# Patient Record
Sex: FEMALE | Race: WHITE | NOT HISPANIC OR LATINO | Employment: FULL TIME | URBAN - METROPOLITAN AREA
[De-identification: names, ages, dates, MRNs, and addresses within clinical notes are randomized per-mention and may not be internally consistent; named-entity substitution may affect disease eponyms.]

---

## 2022-07-01 ENCOUNTER — TRANSFERRED RECORDS (OUTPATIENT)
Dept: HEALTH INFORMATION MANAGEMENT | Facility: CLINIC | Age: 62
End: 2022-07-01

## 2022-09-10 ENCOUNTER — TRANSFERRED RECORDS (OUTPATIENT)
Dept: HEALTH INFORMATION MANAGEMENT | Facility: CLINIC | Age: 62
End: 2022-09-10

## 2022-10-07 ENCOUNTER — TRANSFERRED RECORDS (OUTPATIENT)
Dept: HEALTH INFORMATION MANAGEMENT | Facility: CLINIC | Age: 62
End: 2022-10-07

## 2022-11-09 ENCOUNTER — TRANSFERRED RECORDS (OUTPATIENT)
Dept: HEALTH INFORMATION MANAGEMENT | Facility: CLINIC | Age: 62
End: 2022-11-09

## 2022-11-09 ENCOUNTER — MEDICAL CORRESPONDENCE (OUTPATIENT)
Dept: HEALTH INFORMATION MANAGEMENT | Facility: CLINIC | Age: 62
End: 2022-11-09

## 2022-12-08 ENCOUNTER — TRANSFERRED RECORDS (OUTPATIENT)
Dept: HEALTH INFORMATION MANAGEMENT | Facility: CLINIC | Age: 62
End: 2022-12-08

## 2022-12-19 ENCOUNTER — TELEPHONE (OUTPATIENT)
Dept: NEUROSURGERY | Facility: CLINIC | Age: 62
End: 2022-12-19

## 2022-12-19 NOTE — TELEPHONE ENCOUNTER
[facility] Call Center    Phone Message    May a detailed message be left on voicemail: yes     Reason for Call: Appointment Intake    Referring Provider Name: Self  Diagnosis and/or Symptoms: Recent surgery 10/4/2022 for cervical fusion and decompression with [facility], patient states she is having complications following procedure.  Patient states circulation is being cut off to her head and left arm.  Patient is requesting to schedule with Neurosurgery.    Action Taken: Message routed to:  [facility] Neurosurgery    Travel Screening: Not Applicable

## 2022-12-20 ENCOUNTER — MEDICAL CORRESPONDENCE (OUTPATIENT)
Dept: HEALTH INFORMATION MANAGEMENT | Facility: CLINIC | Age: 62
End: 2022-12-20

## 2022-12-20 NOTE — TELEPHONE ENCOUNTER
Writer returned call to patient. Patient will follow up with provider for referral to Neurosurgery. Writer provided fax number for referral.     Shannon Rodriguez LPN  Neurosurgery

## 2022-12-22 ENCOUNTER — TRANSCRIBE ORDERS (OUTPATIENT)
Dept: OTHER | Age: 62
End: 2022-12-22

## 2022-12-22 ENCOUNTER — TELEPHONE (OUTPATIENT)
Dept: NEUROSURGERY | Facility: CLINIC | Age: 62
End: 2022-12-22

## 2022-12-22 DIAGNOSIS — M54.12 RADICULOPATHY, CERVICAL: Primary | ICD-10-CM

## 2022-12-22 NOTE — TELEPHONE ENCOUNTER
[facility] Call Center    Phone Message    May a detailed message be left on voicemail: yes     Reason for Call: Other: Pt called to make appt off of the referral but the referral did not give writer enough information for writer to schedule.  Please call Pt back to schedule this appt.  Pt was upset that writer could not schedule appt.    Action Taken: Message routed to:  [facility] Neurosurgery    Travel Screening: Not Applicable

## 2022-12-22 NOTE — TELEPHONE ENCOUNTER
Writer routed to Neurosurgery Clinic Scheduling   Referral     Shannon Rodriguez LPN  Neurosurgery

## 2022-12-23 ENCOUNTER — APPOINTMENT (OUTPATIENT)
Dept: CT IMAGING | Facility: CLINIC | Age: 62
End: 2022-12-23
Attending: EMERGENCY MEDICINE
Payer: COMMERCIAL

## 2022-12-23 ENCOUNTER — TELEPHONE (OUTPATIENT)
Dept: NEUROSURGERY | Facility: CLINIC | Age: 62
End: 2022-12-23

## 2022-12-23 ENCOUNTER — HOSPITAL ENCOUNTER (OUTPATIENT)
Facility: CLINIC | Age: 62
Setting detail: OBSERVATION
Discharge: HOME | End: 2022-12-25
Attending: EMERGENCY MEDICINE | Admitting: NURSE PRACTITIONER
Payer: COMMERCIAL

## 2022-12-23 ENCOUNTER — APPOINTMENT (OUTPATIENT)
Dept: MRI IMAGING | Facility: CLINIC | Age: 62
End: 2022-12-23
Attending: EMERGENCY MEDICINE
Payer: COMMERCIAL

## 2022-12-23 DIAGNOSIS — R20.2 PARESTHESIA: ICD-10-CM

## 2022-12-23 DIAGNOSIS — M54.12 CERVICAL RADICULOPATHY: ICD-10-CM

## 2022-12-23 DIAGNOSIS — E07.9 DISEASE OF THYROID GLAND: ICD-10-CM

## 2022-12-23 DIAGNOSIS — R55 PRE-SYNCOPE: ICD-10-CM

## 2022-12-23 LAB
ALBUMIN SERPL-MCNC: 3.9 G/DL (ref 3.4–5)
ALP SERPL-CCNC: 83 U/L (ref 40–150)
ALT SERPL W P-5'-P-CCNC: 23 U/L (ref 0–50)
ANION GAP SERPL CALCULATED.3IONS-SCNC: 4 MMOL/L (ref 3–14)
AST SERPL W P-5'-P-CCNC: 20 U/L (ref 0–45)
BASOPHILS # BLD AUTO: 0.1 10E3/UL (ref 0–0.2)
BASOPHILS NFR BLD AUTO: 1 %
BILIRUB SERPL-MCNC: 0.5 MG/DL (ref 0.2–1.3)
BUN SERPL-MCNC: 10 MG/DL (ref 7–30)
CALCIUM SERPL-MCNC: 9.8 MG/DL (ref 8.5–10.1)
CHLORIDE BLD-SCNC: 105 MMOL/L (ref 94–109)
CO2 SERPL-SCNC: 29 MMOL/L (ref 20–32)
CREAT SERPL-MCNC: 0.83 MG/DL (ref 0.52–1.04)
CRP SERPL-MCNC: <2.9 MG/L (ref 0–8)
EOSINOPHIL # BLD AUTO: 0.1 10E3/UL (ref 0–0.7)
EOSINOPHIL NFR BLD AUTO: 1 %
ERYTHROCYTE [DISTWIDTH] IN BLOOD BY AUTOMATED COUNT: 12.5 % (ref 10–15)
GFR SERPL CREATININE-BSD FRML MDRD: 80 ML/MIN/1.73M2
GLUCOSE BLD-MCNC: 106 MG/DL (ref 70–99)
HCT VFR BLD AUTO: 44.5 % (ref 35–47)
HGB BLD-MCNC: 15.1 G/DL (ref 11.7–15.7)
IMM GRANULOCYTES # BLD: 0 10E3/UL
IMM GRANULOCYTES NFR BLD: 0 %
INR PPP: 0.94 (ref 0.85–1.15)
LYMPHOCYTES # BLD AUTO: 1.6 10E3/UL (ref 0.8–5.3)
LYMPHOCYTES NFR BLD AUTO: 21 %
MCH RBC QN AUTO: 30.9 PG (ref 26.5–33)
MCHC RBC AUTO-ENTMCNC: 33.9 G/DL (ref 31.5–36.5)
MCV RBC AUTO: 91 FL (ref 78–100)
MONOCYTES # BLD AUTO: 0.7 10E3/UL (ref 0–1.3)
MONOCYTES NFR BLD AUTO: 9 %
NEUTROPHILS # BLD AUTO: 5.2 10E3/UL (ref 1.6–8.3)
NEUTROPHILS NFR BLD AUTO: 68 %
NRBC # BLD AUTO: 0 10E3/UL
NRBC BLD AUTO-RTO: 0 /100
PLATELET # BLD AUTO: 337 10E3/UL (ref 150–450)
POTASSIUM BLD-SCNC: 3.8 MMOL/L (ref 3.4–5.3)
PROT SERPL-MCNC: 8 G/DL (ref 6.8–8.8)
RBC # BLD AUTO: 4.89 10E6/UL (ref 3.8–5.2)
SODIUM SERPL-SCNC: 138 MMOL/L (ref 133–144)
TSH SERPL DL<=0.005 MIU/L-ACNC: 1.02 MU/L (ref 0.4–4)
WBC # BLD AUTO: 7.6 10E3/UL (ref 4–11)

## 2022-12-23 PROCEDURE — 84443 ASSAY THYROID STIM HORMONE: CPT | Performed by: EMERGENCY MEDICINE

## 2022-12-23 PROCEDURE — 70551 MRI BRAIN STEM W/O DYE: CPT

## 2022-12-23 PROCEDURE — 250N000011 HC RX IP 250 OP 636: Performed by: EMERGENCY MEDICINE

## 2022-12-23 PROCEDURE — 99284 EMERGENCY DEPT VISIT MOD MDM: CPT | Performed by: EMERGENCY MEDICINE

## 2022-12-23 PROCEDURE — 99285 EMERGENCY DEPT VISIT HI MDM: CPT | Mod: 25 | Performed by: EMERGENCY MEDICINE

## 2022-12-23 PROCEDURE — 36415 COLL VENOUS BLD VENIPUNCTURE: CPT | Performed by: EMERGENCY MEDICINE

## 2022-12-23 PROCEDURE — 80053 COMPREHEN METABOLIC PANEL: CPT | Performed by: EMERGENCY MEDICINE

## 2022-12-23 PROCEDURE — 85610 PROTHROMBIN TIME: CPT | Performed by: EMERGENCY MEDICINE

## 2022-12-23 PROCEDURE — 86140 C-REACTIVE PROTEIN: CPT | Performed by: EMERGENCY MEDICINE

## 2022-12-23 PROCEDURE — G0378 HOSPITAL OBSERVATION PER HR: HCPCS

## 2022-12-23 PROCEDURE — 250N000009 HC RX 250: Performed by: EMERGENCY MEDICINE

## 2022-12-23 PROCEDURE — 71275 CT ANGIOGRAPHY CHEST: CPT

## 2022-12-23 PROCEDURE — 85025 COMPLETE CBC W/AUTO DIFF WBC: CPT | Performed by: EMERGENCY MEDICINE

## 2022-12-23 PROCEDURE — 99218 PR INITIAL OBSERVATION CARE,LEVEL I: CPT | Performed by: NURSE PRACTITIONER

## 2022-12-23 PROCEDURE — 70498 CT ANGIOGRAPHY NECK: CPT

## 2022-12-23 PROCEDURE — 70496 CT ANGIOGRAPHY HEAD: CPT

## 2022-12-23 PROCEDURE — 72141 MRI NECK SPINE W/O DYE: CPT

## 2022-12-23 PROCEDURE — 83036 HEMOGLOBIN GLYCOSYLATED A1C: CPT | Performed by: NURSE PRACTITIONER

## 2022-12-23 RX ORDER — IOPAMIDOL 755 MG/ML
100 INJECTION, SOLUTION INTRAVASCULAR ONCE
Status: COMPLETED | OUTPATIENT
Start: 2022-12-23 | End: 2022-12-23

## 2022-12-23 RX ORDER — LORAZEPAM 2 MG/ML
0.5 INJECTION INTRAMUSCULAR
Status: DISCONTINUED | OUTPATIENT
Start: 2022-12-23 | End: 2022-12-25 | Stop reason: HOSPADM

## 2022-12-23 RX ORDER — PREDNISONE 20 MG/1
TABLET ORAL
Qty: 10 TABLET | Refills: 0 | Status: SHIPPED | OUTPATIENT
Start: 2022-12-23 | End: 2022-12-25

## 2022-12-23 RX ORDER — BUTALBITAL, ACETAMINOPHEN AND CAFFEINE 50; 325; 40 MG/1; MG/1; MG/1
1-2 TABLET ORAL EVERY 8 HOURS PRN
Qty: 24 TABLET | Refills: 0 | Status: SHIPPED | OUTPATIENT
Start: 2022-12-23 | End: 2022-12-25

## 2022-12-23 RX ADMIN — IOPAMIDOL 54 ML: 755 INJECTION, SOLUTION INTRAVENOUS at 21:56

## 2022-12-23 RX ADMIN — IOPAMIDOL 75 ML: 755 INJECTION, SOLUTION INTRAVENOUS at 21:32

## 2022-12-23 RX ADMIN — SODIUM CHLORIDE 90 ML: 9 INJECTION, SOLUTION INTRAVENOUS at 21:55

## 2022-12-23 RX ADMIN — SODIUM CHLORIDE 75 ML: 9 INJECTION, SOLUTION INTRAVENOUS at 21:56

## 2022-12-23 ASSESSMENT — ACTIVITIES OF DAILY LIVING (ADL)
ADLS_ACUITY_SCORE: 35
ADLS_ACUITY_SCORE: 33
ADLS_ACUITY_SCORE: 35
ADLS_ACUITY_SCORE: 35

## 2022-12-23 NOTE — TELEPHONE ENCOUNTER
Referring notes and prior OP note 10/04/2022 under Media tab and Care everywhere.   The following Images are in PACS  12/08/2022-CT cervical  07/01/2022-MR Lumber  07/01/2022-MR Cervical    Unique Reyna LPN

## 2022-12-23 NOTE — ED PROVIDER NOTES
[facility] EMERGENCY DEPARTMENT ([facility])     December 23, 2022      History     Chief Complaint   Patient presents with     Numbness     Patient states since surgery in October patient has been feeling like her head is cutting off circulation to her entire body, numbness, difficulty focusing.      HPI  Christa Huang is a 62 year old female who had the cervical spine fusion done in October of this year for cervical radiculopathy. The patient states her surgery was done at [facility] and states that since that time she has had continued numbness into her left arm and hand. Patient states she followed up with a CT on December 8 with the surgeon but states that besides that she still has had worsening symptoms now with tightness in the back of her neck. She states she feels xavier she is getting the circulation to her head cut off completely from the surgery. Patient describes total body paresthesias and denies any coughing or shortness of breath.    Past Medical History  Past Medical History:   Diagnosis Date     Depressive disorder      Past Surgical History:   Procedure Laterality Date     ABDOMEN SURGERY       BACK SURGERY       BREAST SURGERY       ENT SURGERY       GENITOURINARY SURGERY       GI SURGERY       GYN SURGERY       ORTHOPEDIC SURGERY       LEVOTHYROXINE SODIUM PO  LEVOTHYROXINE SODIUM PO  mirtazapine (REMERON) 15 MG tablet  Omega-3 Fatty Acids (OMEGA-3 FISH OIL PO)  Pediatric Multivit-Minerals-C (MULTIVITAMIN GUMMIES CHILDRENS) CHEW  vitamin  B complex with vitamin C (VITAMIN  B COMPLEX) TABS      Allergies   Allergen Reactions     Avocado Anaphylaxis     Itching, Hives, Throat thickening     Banana Anaphylaxis     Hives, itching, throat scratchy     Latex Anaphylaxis     Melon Anaphylaxis     Hives, itching, scratchy thickening throat       Penicillins Swelling     Codeine Rash     Family History  No family history on file.     Social History       Past medical history, past surgical history,  "medications, allergies, family history, and social history were reviewed with the patient. No additional pertinent items.       Review of Systems  A complete review of systems was performed with pertinent positives and negatives noted in the HPI, and all other systems negative.    Physical Exam   BP: (!) 148/88  Pulse: 98  Temp: 98.8  F (37.1  C)  Resp: 18  Height: 157.5 cm (5' 2\")  Weight: 81.1 kg (178 lb 12.8 oz)  SpO2: 100 %  Physical Exam  Vitals and nursing note reviewed.   Constitutional:       Appearance: She is not ill-appearing or diaphoretic.      Comments: Moves her head about without difficulty and ambulates around the ER without difficulty   Eyes:      Extraocular Movements: Extraocular movements intact.      Pupils: Pupils are equal, round, and reactive to light.   Neck:      Comments: Postsurgical changes posteriorly that are well-healed  Cardiovascular:      Rate and Rhythm: Regular rhythm.   Pulmonary:      Breath sounds: Normal breath sounds.   Musculoskeletal:         General: No deformity.   Neurological:      Mental Status: She is alert and oriented to person, place, and time.      Comments: Patient has some slight weakness with her proximal muscles in her left arm which may be chronic in nature.   Psychiatric:         Mood and Affect: Mood normal.         ED Course      Procedures          Results for orders placed or performed during the hospital encounter of 12/23/22   CRP inflammation     Status: Normal   Result Value Ref Range    CRP Inflammation <2.9 0.0 - 8.0 mg/L   Comprehensive metabolic panel     Status: Abnormal   Result Value Ref Range    Sodium 138 133 - 144 mmol/L    Potassium 3.8 3.4 - 5.3 mmol/L    Chloride 105 94 - 109 mmol/L    Carbon Dioxide (CO2) 29 20 - 32 mmol/L    Anion Gap 4 3 - 14 mmol/L    Urea Nitrogen 10 7 - 30 mg/dL    Creatinine 0.83 0.52 - 1.04 mg/dL    Calcium 9.8 8.5 - 10.1 mg/dL    Glucose 106 (H) 70 - 99 mg/dL    Alkaline Phosphatase 83 40 - 150 U/L    AST 20 0 " - 45 U/L    ALT 23 0 - 50 U/L    Protein Total 8.0 6.8 - 8.8 g/dL    Albumin 3.9 3.4 - 5.0 g/dL    Bilirubin Total 0.5 0.2 - 1.3 mg/dL    GFR Estimate 80 >60 mL/min/1.73m2   INR     Status: Normal   Result Value Ref Range    INR 0.94 0.85 - 1.15   CBC with platelets and differential     Status: None   Result Value Ref Range    WBC Count 7.6 4.0 - 11.0 10e3/uL    RBC Count 4.89 3.80 - 5.20 10e6/uL    Hemoglobin 15.1 11.7 - 15.7 g/dL    Hematocrit 44.5 35.0 - 47.0 %    MCV 91 78 - 100 fL    MCH 30.9 26.5 - 33.0 pg    MCHC 33.9 31.5 - 36.5 g/dL    RDW 12.5 10.0 - 15.0 %    Platelet Count 337 150 - 450 10e3/uL    % Neutrophils 68 %    % Lymphocytes 21 %    % Monocytes 9 %    % Eosinophils 1 %    % Basophils 1 %    % Immature Granulocytes 0 %    NRBCs per 100 WBC 0 <1 /100    Absolute Neutrophils 5.2 1.6 - 8.3 10e3/uL    Absolute Lymphocytes 1.6 0.8 - 5.3 10e3/uL    Absolute Monocytes 0.7 0.0 - 1.3 10e3/uL    Absolute Eosinophils 0.1 0.0 - 0.7 10e3/uL    Absolute Basophils 0.1 0.0 - 0.2 10e3/uL    Absolute Immature Granulocytes 0.0 <=0.4 10e3/uL    Absolute NRBCs 0.0 10e3/uL   CBC with platelets differential     Status: None    Narrative    The following orders were created for panel order CBC with platelets differential.  Procedure                               Abnormality         Status                     ---------                               -----------         ------                     CBC with platelets and d...[[id number]]                      Final result                 Please view results for these tests on the individual orders.     Medications   sodium chloride (PF) 0.9% PF flush 3 mL (has no administration in time range)   sodium chloride (PF) 0.9% PF flush 3 mL (has no administration in time range)   LORazepam (ATIVAN) injection 0.5 mg (has no administration in time range)        Assessments & Plan (with Medical Decision Making)     I have reviewed the nursing notes.    I discussed the case with radiology  and we decided to go ahead with a noncontrast MRI to further assess her cervical cord and radicular type symptoms.  In the interim the patient stated she also wanted her head scanned because she is having some any headaches associated with her neck surgery.  At this time the patient's MRIs are pending.  The case will be signed out to my partners will follow up with the MRI results and need for further consultation in the ER as indicated.  Otherwise the patient's discs from her previous images were scanned into the system to aid radiology in comparison and if there is no change from previous and no acute pathology, the patient will be discharged home with the medications and instructions below.        New Prescriptions    BUTALBITAL-ACETAMINOPHEN-CAFFEINE (ESGIC) -40 MG TABLET    Take 1-2 tablets by mouth every 8 hours as needed for headaches    PREDNISONE (DELTASONE) 20 MG TABLET    Take two tablets (= 40mg) each day for 5 (five) days       Final diagnoses:   Cervical radiculopathy - left, with tension headaches     Home to rest tonight    Fill your prescriptions and take as directed    Please make an appointment to follow up with your neck surgeon or your Primary Care Provider in 1 week for recheck.    Return to the ER for worsening or fever      --  Jose Gaines MD  [facility] EMERGENCY DEPARTMENT  12/23/2022     Jose Gaines MD  12/23/22 [id number]

## 2022-12-23 NOTE — ED TRIAGE NOTES
Patient states that her circulation is being cut off from her head to her entire body. States to have had surgery on October 4 (back stuff), since then has been having problems, she states she feels like she is going to pass out, head pressure, and feels like her whole body is numb.      Triage Assessment     Row Name 12/23/22 0836       Triage Assessment (Adult)    Airway WDL WDL       Respiratory WDL    Respiratory WDL WDL       Skin Circulation/Temperature WDL    Skin Circulation/Temperature WDL WDL       Cardiac WDL    Cardiac WDL WDL       Peripheral/Neurovascular WDL    Peripheral Neurovascular WDL WDL       Cognitive/Neuro/Behavioral WDL    Cognitive/Neuro/Behavioral WDL WDL

## 2022-12-23 NOTE — DISCHARGE INSTRUCTIONS
Home to rest tonight    Fill your prescriptions and take as directed    Please make an appointment to follow up with your neck surgeon or your Primary Care Provider in 1 week for recheck.    Return to the ER for worsening or fever

## 2022-12-23 NOTE — TELEPHONE ENCOUNTER
Referred by:   Dr Pooja Chavez   [facility]   [address] [place] [address] [place] [id number]   Phone: [phone], Fax: [phone]   Please call to schedule your appointment             Order Questions    Question Answer   Preferred Location: [facility] [place]   Scheduling Instructions: Please call to schedule your appointment   My Clinical Question Is: Worsening symptoms after surgery

## 2022-12-24 ENCOUNTER — APPOINTMENT (OUTPATIENT)
Dept: CT IMAGING | Facility: CLINIC | Age: 62
End: 2022-12-24
Payer: COMMERCIAL

## 2022-12-24 LAB
ANION GAP SERPL CALCULATED.3IONS-SCNC: 13 MMOL/L (ref 7–15)
BUN SERPL-MCNC: 8.3 MG/DL (ref 8–23)
CALCIUM SERPL-MCNC: 9.1 MG/DL (ref 8.8–10.2)
CHLORIDE SERPL-SCNC: 104 MMOL/L (ref 98–107)
CREAT SERPL-MCNC: 0.79 MG/DL (ref 0.51–0.95)
DEPRECATED CALCIDIOL+CALCIFEROL SERPL-MC: 14 UG/L (ref 20–75)
DEPRECATED HCO3 PLAS-SCNC: 21 MMOL/L (ref 22–29)
ERYTHROCYTE [DISTWIDTH] IN BLOOD BY AUTOMATED COUNT: 12.6 % (ref 10–15)
FOLATE SERPL-MCNC: 11.8 NG/ML (ref 4.6–34.8)
GFR SERPL CREATININE-BSD FRML MDRD: 85 ML/MIN/1.73M2
GLUCOSE SERPL-MCNC: 115 MG/DL (ref 70–99)
HBA1C MFR BLD: 5.7 % (ref 0–5.6)
HCT VFR BLD AUTO: 41.7 % (ref 35–47)
HGB BLD-MCNC: 13.4 G/DL (ref 11.7–15.7)
MCH RBC QN AUTO: 30.3 PG (ref 26.5–33)
MCHC RBC AUTO-ENTMCNC: 32.1 G/DL (ref 31.5–36.5)
MCV RBC AUTO: 94 FL (ref 78–100)
PLATELET # BLD AUTO: 338 10E3/UL (ref 150–450)
POTASSIUM SERPL-SCNC: 3.9 MMOL/L (ref 3.4–5.3)
RBC # BLD AUTO: 4.42 10E6/UL (ref 3.8–5.2)
SODIUM SERPL-SCNC: 138 MMOL/L (ref 136–145)
TROPONIN T SERPL HS-MCNC: <6 NG/L
VIT B12 SERPL-MCNC: 315 PG/ML (ref 232–1245)
WBC # BLD AUTO: 6.8 10E3/UL (ref 4–11)

## 2022-12-24 PROCEDURE — 70496 CT ANGIOGRAPHY HEAD: CPT | Mod: 26 | Performed by: RADIOLOGY

## 2022-12-24 PROCEDURE — 70496 CT ANGIOGRAPHY HEAD: CPT

## 2022-12-24 PROCEDURE — 80048 BASIC METABOLIC PNL TOTAL CA: CPT | Performed by: NURSE PRACTITIONER

## 2022-12-24 PROCEDURE — 70498 CT ANGIOGRAPHY NECK: CPT

## 2022-12-24 PROCEDURE — 82607 VITAMIN B-12: CPT | Performed by: NURSE PRACTITIONER

## 2022-12-24 PROCEDURE — G0378 HOSPITAL OBSERVATION PER HR: HCPCS

## 2022-12-24 PROCEDURE — 99222 1ST HOSP IP/OBS MODERATE 55: CPT | Mod: GC | Performed by: PSYCHIATRY & NEUROLOGY

## 2022-12-24 PROCEDURE — 93005 ELECTROCARDIOGRAM TRACING: CPT

## 2022-12-24 PROCEDURE — 99225 PR SUBSEQUENT OBSERVATION CARE,LEVEL II: CPT | Performed by: INTERNAL MEDICINE

## 2022-12-24 PROCEDURE — 250N000013 HC RX MED GY IP 250 OP 250 PS 637: Performed by: NURSE PRACTITIONER

## 2022-12-24 PROCEDURE — 70498 CT ANGIOGRAPHY NECK: CPT | Mod: 26 | Performed by: RADIOLOGY

## 2022-12-24 PROCEDURE — 85027 COMPLETE CBC AUTOMATED: CPT | Performed by: NURSE PRACTITIONER

## 2022-12-24 PROCEDURE — 82306 VITAMIN D 25 HYDROXY: CPT | Performed by: NURSE PRACTITIONER

## 2022-12-24 PROCEDURE — 36415 COLL VENOUS BLD VENIPUNCTURE: CPT | Performed by: PHYSICIAN ASSISTANT

## 2022-12-24 PROCEDURE — 250N000011 HC RX IP 250 OP 636: Performed by: INTERNAL MEDICINE

## 2022-12-24 PROCEDURE — 84484 ASSAY OF TROPONIN QUANT: CPT | Performed by: PHYSICIAN ASSISTANT

## 2022-12-24 PROCEDURE — 36415 COLL VENOUS BLD VENIPUNCTURE: CPT | Performed by: NURSE PRACTITIONER

## 2022-12-24 PROCEDURE — 82746 ASSAY OF FOLIC ACID SERUM: CPT | Performed by: NURSE PRACTITIONER

## 2022-12-24 PROCEDURE — 93010 ELECTROCARDIOGRAM REPORT: CPT | Performed by: INTERNAL MEDICINE

## 2022-12-24 RX ORDER — ONDANSETRON 4 MG/1
4 TABLET, ORALLY DISINTEGRATING ORAL EVERY 6 HOURS PRN
Status: DISCONTINUED | OUTPATIENT
Start: 2022-12-24 | End: 2022-12-25 | Stop reason: HOSPADM

## 2022-12-24 RX ORDER — ACETAMINOPHEN 650 MG/1
650 SUPPOSITORY RECTAL EVERY 4 HOURS PRN
Status: DISCONTINUED | OUTPATIENT
Start: 2022-12-24 | End: 2022-12-25 | Stop reason: HOSPADM

## 2022-12-24 RX ORDER — ACETAMINOPHEN 325 MG/1
650 TABLET ORAL EVERY 4 HOURS PRN
Status: DISCONTINUED | OUTPATIENT
Start: 2022-12-24 | End: 2022-12-25 | Stop reason: HOSPADM

## 2022-12-24 RX ORDER — LIDOCAINE 40 MG/G
CREAM TOPICAL
Status: DISCONTINUED | OUTPATIENT
Start: 2022-12-24 | End: 2022-12-25 | Stop reason: HOSPADM

## 2022-12-24 RX ORDER — ONDANSETRON 2 MG/ML
4 INJECTION INTRAMUSCULAR; INTRAVENOUS EVERY 6 HOURS PRN
Status: DISCONTINUED | OUTPATIENT
Start: 2022-12-24 | End: 2022-12-25 | Stop reason: HOSPADM

## 2022-12-24 RX ORDER — NITROGLYCERIN 0.4 MG/1
0.4 TABLET SUBLINGUAL EVERY 5 MIN PRN
Status: DISCONTINUED | OUTPATIENT
Start: 2022-12-24 | End: 2022-12-25 | Stop reason: HOSPADM

## 2022-12-24 RX ORDER — IOPAMIDOL 755 MG/ML
75 INJECTION, SOLUTION INTRAVASCULAR ONCE
Status: COMPLETED | OUTPATIENT
Start: 2022-12-24 | End: 2022-12-24

## 2022-12-24 RX ADMIN — LEVOTHYROXINE SODIUM 75 MCG: 25 TABLET ORAL at 07:39

## 2022-12-24 RX ADMIN — IOPAMIDOL 75 ML: 755 INJECTION, SOLUTION INTRAVENOUS at 15:19

## 2022-12-24 ASSESSMENT — ACTIVITIES OF DAILY LIVING (ADL)
ADLS_ACUITY_SCORE: 31

## 2022-12-24 ASSESSMENT — ENCOUNTER SYMPTOMS
DIETARY ISSUES: ADEQUATE INTAKE
NO PATIENT REPORTED PAIN: 1

## 2022-12-24 NOTE — PLAN OF CARE
"Observation Goals:  - Diagnostic tests and consults completed and resulted - Not met  - No further episodes of syncope and any new arrhythmia addressed with controlled heart rates - Met  - Vital signs normal or at patient baseline and orthostatic vitals are normal and patient not lightheaded with standing - Met /80 (BP Location: Left arm)   Pulse 75   Temp 97.5  F (36.4  C) (Oral)   Resp 18   Ht 1.575 m (5' 2\")   Wt 81.1 kg (178 lb 12.8 oz)   SpO2 97%   BMI 32.70 kg/m     - Tolerating oral intake to maintain hydration - Met  - Safe disposition plan has been identified - Not met  "

## 2022-12-24 NOTE — PLAN OF CARE
"Goal Outcome Evaluation:         Observation Goals:     - Diagnostic tests and consults completed and resulted - Not met  - No further episodes of syncope and any new arrhythmia addressed with controlled heart rates - Met  - Vital signs normal or at patient baseline and orthostatic vitals are normal and patient not lightheaded with standing - Met /68 (BP Location: Left arm)   Pulse 76   Temp 97.7  F (36.5  C)   Resp 11   Ht 1.575 m (5' 2\")   Wt 81.1 kg (178 lb 12.8 oz)   SpO2 98%   BMI 32.70 kg/m    - Tolerating oral intake to maintain hydration - Met  - Safe disposition plan has been identified - Not met    Pt went CT and waiting Echo               "
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
## 2022-12-24 NOTE — PROGRESS NOTES
"Christa Huang is a 62 year old female patient.  1. Cervical radiculopathy    2. Pre-syncope      Past Medical History:   Diagnosis Date     Depressive disorder      Current Outpatient Medications   Medication Sig Dispense Refill     butalbital-acetaminophen-caffeine (ESGIC) -40 MG tablet Take 1-2 tablets by mouth every 8 hours as needed for headaches 24 tablet 0     predniSONE (DELTASONE) 20 MG tablet Take two tablets (= 40mg) each day for 5 (five) days 10 tablet 0     Allergies   Allergen Reactions     Avocado Anaphylaxis     Itching, Hives, Throat thickening     Banana Anaphylaxis     Hives, itching, throat scratchy     Latex Anaphylaxis     Melon Anaphylaxis     Hives, itching, scratchy thickening throat       Penicillins Swelling     Codeine Rash     Active Problems:    * No active hospital problems. *    Blood pressure 128/78, pulse 71, temperature 98.5  F (36.9  C), temperature source Oral, resp. rate 16, height 1.575 m (5' 2\"), weight 81.1 kg (178 lb 12.8 oz), SpO2 96 %.    Subjective:  Symptoms:  Worsening.  (Numbness over face and left shoulder).    Diet:  Adequate intake.    Activity level: Activity impairment: imparied due to numbness.    Pain:  She reports no pain.      Objective:  General Appearance:  Comfortable.    Vital signs: (most recent): Blood pressure 128/78, pulse 71, temperature 98.5  F (36.9  C), temperature source Oral, resp. rate 16, height 1.575 m (5' 2\"), weight 81.1 kg (178 lb 12.8 oz), SpO2 96 %.  Vital signs are normal.    Output: Producing urine.    HEENT: Normal HEENT exam.    Lungs:  Normal effort and normal respiratory rate.  Breath sounds clear to auscultation.    Heart: Normal rate.  Regular rhythm.  S1 normal and S2 normal.    Abdomen: Abdomen is soft.  Bowel sounds are normal.   There is no abdominal tenderness.     Extremities: Normal range of motion.    Pulses: Distal pulses are intact.    Neurological: Patient is alert.    Pupils:  Pupils are equal, round, and " reactive to light.    Skin:  Warm.      Assessment:    Condition: In stable condition.  Unchanged.   (62 yof presents with numbness on face and left shoulder worsening since recent c spine decompression fusion.    MRI brain-no CVA, neck-no acute lesions per Rad. CT PE neg.    Awaiting Neuro input. May also consider cardiac work up with EKG trop stress?).     The pt was seen and examined by myself. The case was reviewed and the plan was discussed with the [facility].    Alex Meyer MD, MD  12/24/2022

## 2022-12-24 NOTE — ED PROVIDER NOTES
"     Emergency Department Patient Sign-out       Brief HPI:  This is a 62 year old female signed out to me by Dr. Gaines .  See initial ED Provider note for details of the presentation.            Significant Events prior to my assuming care: per Epic      Exam:   Patient Vitals for the past 24 hrs:   BP Temp Temp src Pulse Resp SpO2 Height Weight   12/24/22 0031 132/71 -- -- 70 -- 97 % -- --   12/24/22 0030 -- -- -- -- -- 97 % -- --   12/24/22 0021 132/71 -- -- 70 -- 97 % -- --   12/24/22 0001 132/71 -- -- -- -- -- -- --   12/23/22 1930 -- -- -- -- -- 100 % -- --   12/23/22 1925 -- -- -- -- 18 100 % -- --   12/23/22 1230 (!) 148/88 98.8  F (37.1  C) Oral 98 18 100 % 1.575 m (5' 2\") 81.1 kg (178 lb 12.8 oz)           ED RESULTS:   Results for orders placed or performed during the hospital encounter of 12/23/22 (from the past 24 hour(s))   CBC with platelets differential     Status: None    Collection Time: 12/23/22  1:19 PM    Narrative    The following orders were created for panel order CBC with platelets differential.  Procedure                               Abnormality         Status                     ---------                               -----------         ------                     CBC with platelets and d...[[id number]]                      Final result                 Please view results for these tests on the individual orders.   CRP inflammation     Status: Normal    Collection Time: 12/23/22  1:19 PM   Result Value Ref Range    CRP Inflammation <2.9 0.0 - 8.0 mg/L   Comprehensive metabolic panel     Status: Abnormal    Collection Time: 12/23/22  1:19 PM   Result Value Ref Range    Sodium 138 133 - 144 mmol/L    Potassium 3.8 3.4 - 5.3 mmol/L    Chloride 105 94 - 109 mmol/L    Carbon Dioxide (CO2) 29 20 - 32 mmol/L    Anion Gap 4 3 - 14 mmol/L    Urea Nitrogen 10 7 - 30 mg/dL    Creatinine 0.83 0.52 - 1.04 mg/dL    Calcium 9.8 8.5 - 10.1 mg/dL    Glucose 106 (H) 70 - 99 mg/dL    Alkaline Phosphatase " 83 40 - 150 U/L    AST 20 0 - 45 U/L    ALT 23 0 - 50 U/L    Protein Total 8.0 6.8 - 8.8 g/dL    Albumin 3.9 3.4 - 5.0 g/dL    Bilirubin Total 0.5 0.2 - 1.3 mg/dL    GFR Estimate 80 >60 mL/min/1.73m2   INR     Status: Normal    Collection Time: 12/23/22  1:19 PM   Result Value Ref Range    INR 0.94 0.85 - 1.15   CBC with platelets and differential     Status: None    Collection Time: 12/23/22  1:19 PM   Result Value Ref Range    WBC Count 7.6 4.0 - 11.0 10e3/uL    RBC Count 4.89 3.80 - 5.20 10e6/uL    Hemoglobin 15.1 11.7 - 15.7 g/dL    Hematocrit 44.5 35.0 - 47.0 %    MCV 91 78 - 100 fL    MCH 30.9 26.5 - 33.0 pg    MCHC 33.9 31.5 - 36.5 g/dL    RDW 12.5 10.0 - 15.0 %    Platelet Count 337 150 - 450 10e3/uL    % Neutrophils 68 %    % Lymphocytes 21 %    % Monocytes 9 %    % Eosinophils 1 %    % Basophils 1 %    % Immature Granulocytes 0 %    NRBCs per 100 WBC 0 <1 /100    Absolute Neutrophils 5.2 1.6 - 8.3 10e3/uL    Absolute Lymphocytes 1.6 0.8 - 5.3 10e3/uL    Absolute Monocytes 0.7 0.0 - 1.3 10e3/uL    Absolute Eosinophils 0.1 0.0 - 0.7 10e3/uL    Absolute Basophils 0.1 0.0 - 0.2 10e3/uL    Absolute Immature Granulocytes 0.0 <=0.4 10e3/uL    Absolute NRBCs 0.0 10e3/uL   TSH with free T4 reflex     Status: Normal    Collection Time: 12/23/22  1:19 PM   Result Value Ref Range    TSH 1.02 0.40 - 4.00 mU/L   Cervical spine MRI w/o contrast     Status: None    Collection Time: 12/23/22  5:57 PM    Narrative    EXAM: MR BRAIN W/O CONTRAST, MR CERVICAL SPINE W/O CONTRAST  LOCATION: [facility]  DATE/TIME: 12/23/2022 5:57 PM    INDICATION: Headache; Acute headache (< 3 months), no complicating features.  TECHNIQUE:   HEAD MRI: Performed without IV contrast.  CERVICAL SPINE MRI: Performed without IV contrast.    COMPARISON: Cervical spine MRI 4/15/2013    FINDINGS:   HEAD MRI:   INTRACRANIAL CONTENTS: No acute or subacute infarct. No mass, acute hemorrhage, or extra-axial  fluid collections. Scattered nonspecific T2/FLAIR hyperintensities within the cerebral white matter most consistent with mild chronic microvascular ischemic   change. Normal ventricles and sulci. Normal position of the cerebellar tonsils. The major intracranial flow voids are unremarkable.    SELLA: No abnormality accounting for technique.    OSSEOUS STRUCTURES/SOFT TISSUES: Unremarkable marrow signal. Unremarkable extracranial soft tissues.     ORBITS: No abnormality accounting for technique.     SINUSES/MASTOIDS: Mild mucosal thickening scattered about the paranasal sinuses. No middle ear or mastoid effusion.    CERVICAL SPINE MRI:   POSTOPERATIVE CHANGE: Interbody fusion at C4-C7. Ventral plate-and-screw construct C6-C7.    ALIGNMENT: Minimal C3-C4 retrolisthesis.    BONES: There is no evidence of a marrow-replacing process. There is no evidence of abnormal bony edema. Vertebral body heights are unremarkable.    DISCS: Postoperative changes as above. Mild C2-C3 and C3-C4 spondylosis.    SPINAL CORD: No convincing signal abnormalities. No evidence for diffusion restriction.    SPINAL CANAL: No high-grade stenosis.    SOFT TISSUES: Unremarkable.    SIGNIFICANT FINDINGS BY LEVEL:  Craniocervical junction and C1-C2: Unremarkable.    C2-C3: Preserved intervertebral disc height. Tiny central protrusion. No facet arthropathy. No significant spinal canal stenosis. No right neural foraminal stenosis. No left neural foraminal stenosis.    C3-C4: Mild intervertebral disc height loss. Shallow central protrusion. Bilateral uncovertebral spurring. Mild left-sided facet arthropathy. Ligamentum flavum thickening. Mild to moderate spinal canal stenosis. No neural foraminal stenosis.    C4-C5: Interbody fusion as above. No posterior disc abnormality. No facet arthropathy. Mild interbody spurring. Mild spinal canal stenosis. No neural foraminal stenosis.    C5-C6: Interbody fusion as above. Mild left-sided facet arthropathy. No  spinal canal stenosis. Mild right and mild left neural foraminal stenosis.     C6-C7: Anterior fusion as above. Mild interbody spurring. No significant facet arthropathy. Mild spinal canal stenosis. No right neural foraminal stenosis. Mild left neural foraminal stenosis.    C7-T1: Normal disc height. No herniation. No facet arthropathy. No spinal canal stenosis. No right neural foraminal stenosis. No left neural foraminal stenosis.    T3-T4: Broad-based central protrusion with mild spinal canal stenosis. Mild facet arthropathy. No significant neural foraminal stenosis.      Impression    CONCLUSION:  HEAD MRI:  1.  No evidence of an acute intracranial abnormality.  2.  Mild presumed chronic small vessel ischemic change.    CERVICAL SPINE MRI:  1.  Postoperative changes of interbody fusion at C4-C7, as well as ventral plate-and-screw fusion at C6-C7.  2.  No convincing cervical cord signal abnormalities.  3.  At C3-C4, there is mild-to-moderate spinal canal stenosis.  4.  Additional degenerative changes as above, including multiple sites of mild foraminal narrowing.   MR Brain w/o Contrast     Status: None    Collection Time: 12/23/22  5:57 PM    Narrative    EXAM: MR BRAIN W/O CONTRAST, MR CERVICAL SPINE W/O CONTRAST  LOCATION: [facility]  DATE/TIME: 12/23/2022 5:57 PM    INDICATION: Headache; Acute headache (< 3 months), no complicating features.  TECHNIQUE:   HEAD MRI: Performed without IV contrast.  CERVICAL SPINE MRI: Performed without IV contrast.    COMPARISON: Cervical spine MRI 4/15/2013    FINDINGS:   HEAD MRI:   INTRACRANIAL CONTENTS: No acute or subacute infarct. No mass, acute hemorrhage, or extra-axial fluid collections. Scattered nonspecific T2/FLAIR hyperintensities within the cerebral white matter most consistent with mild chronic microvascular ischemic   change. Normal ventricles and sulci. Normal position of the cerebellar tonsils. The major  intracranial flow voids are unremarkable.    SELLA: No abnormality accounting for technique.    OSSEOUS STRUCTURES/SOFT TISSUES: Unremarkable marrow signal. Unremarkable extracranial soft tissues.     ORBITS: No abnormality accounting for technique.     SINUSES/MASTOIDS: Mild mucosal thickening scattered about the paranasal sinuses. No middle ear or mastoid effusion.    CERVICAL SPINE MRI:   POSTOPERATIVE CHANGE: Interbody fusion at C4-C7. Ventral plate-and-screw construct C6-C7.    ALIGNMENT: Minimal C3-C4 retrolisthesis.    BONES: There is no evidence of a marrow-replacing process. There is no evidence of abnormal bony edema. Vertebral body heights are unremarkable.    DISCS: Postoperative changes as above. Mild C2-C3 and C3-C4 spondylosis.    SPINAL CORD: No convincing signal abnormalities. No evidence for diffusion restriction.    SPINAL CANAL: No high-grade stenosis.    SOFT TISSUES: Unremarkable.    SIGNIFICANT FINDINGS BY LEVEL:  Craniocervical junction and C1-C2: Unremarkable.    C2-C3: Preserved intervertebral disc height. Tiny central protrusion. No facet arthropathy. No significant spinal canal stenosis. No right neural foraminal stenosis. No left neural foraminal stenosis.    C3-C4: Mild intervertebral disc height loss. Shallow central protrusion. Bilateral uncovertebral spurring. Mild left-sided facet arthropathy. Ligamentum flavum thickening. Mild to moderate spinal canal stenosis. No neural foraminal stenosis.    C4-C5: Interbody fusion as above. No posterior disc abnormality. No facet arthropathy. Mild interbody spurring. Mild spinal canal stenosis. No neural foraminal stenosis.    C5-C6: Interbody fusion as above. Mild left-sided facet arthropathy. No spinal canal stenosis. Mild right and mild left neural foraminal stenosis.     C6-C7: Anterior fusion as above. Mild interbody spurring. No significant facet arthropathy. Mild spinal canal stenosis. No right neural foraminal stenosis. Mild left neural  foraminal stenosis.    C7-T1: Normal disc height. No herniation. No facet arthropathy. No spinal canal stenosis. No right neural foraminal stenosis. No left neural foraminal stenosis.    T3-T4: Broad-based central protrusion with mild spinal canal stenosis. Mild facet arthropathy. No significant neural foraminal stenosis.      Impression    CONCLUSION:  HEAD MRI:  1.  No evidence of an acute intracranial abnormality.  2.  Mild presumed chronic small vessel ischemic change.    CERVICAL SPINE MRI:  1.  Postoperative changes of interbody fusion at C4-C7, as well as ventral plate-and-screw fusion at C6-C7.  2.  No convincing cervical cord signal abnormalities.  3.  At C3-C4, there is mild-to-moderate spinal canal stenosis.  4.  Additional degenerative changes as above, including multiple sites of mild foraminal narrowing.   CT Chest Pulmonary Embolism w Contrast     Status: None    Collection Time: 12/23/22 10:11 PM    Narrative    EXAM: CT CHEST PULMONARY EMBOLISM W CONTRAST  LOCATION: [facility]  DATE/TIME: 12/23/2022 10:11 PM    INDICATION: Female sex; Not pregnant; No imaging to r o PE in the last 24 hours; Pulmonary Embolism Rule Out Criteria (PERC) score > 0; Revised [place] Score (RGS) not >= 11; No D dimer result available; D dimer not ordered, shortness of breath, chest   pain  COMPARISON: None.  TECHNIQUE: CT chest pulmonary angiogram during arterial phase injection of IV contrast. Multiplanar reformats and MIP reconstructions were performed. Dose reduction techniques were used.   CONTRAST: 54mL isovue 370    FINDINGS:  ANGIOGRAM CHEST: Pulmonary arteries are normal caliber and negative for pulmonary emboli. Thoracic aorta is negative for dissection. No CT evidence of right heart strain.    LUNGS AND PLEURA: Lungs are clear. No pleural effusion.    MEDIASTINUM/AXILLAE: No lymphadenopathy. Normal esophagus. No significant pericardial effusion.    CORONARY ARTERY  CALCIFICATION: Mild.    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    MUSCULOSKELETAL: No concerning bone lesions.      Impression    IMPRESSION:  1.  No CT evidence of pulmonary embolus to the segmental level   CTA Head Neck with Contrast     Status: None    Collection Time: 12/23/22 10:14 PM    Narrative    EXAM: CTA HEAD NECK W CONTRAST  LOCATION: [facility]  DATE/TIME: 12/23/2022 10:14 PM    INDICATION: Neck pain; Neck pain, no complicating feature; No chronic neck pain >= 3 months  COMPARISON: None.  CONTRAST: 75mL isovue 370  TECHNIQUE: Head and neck CT angiogram with IV contrast. Axial helical CT images of the head and neck vessels obtained during the arterial phase of intravenous contrast administration. Axial 2D reconstructed images and multiplanar 3D MIP reconstructed   images of the head and neck vessels were performed by the technologist. Dose reduction techniques were used. All stenosis measurements made according to NASCET criteria unless otherwise specified.    FINDINGS:   HEAD CTA:  ANTERIOR CIRCULATION: No stenosis/occlusion, aneurysm, or high flow vascular malformation. Fetal origin of the right posterior cerebral artery from the anterior circulation.    POSTERIOR CIRCULATION: No stenosis/occlusion, aneurysm, or high flow vascular malformation. Balanced vertebral arteries supply a normal basilar artery.     DURAL VENOUS SINUSES: Expected enhancement of the major dural venous sinuses.    NECK CTA:  RIGHT CAROTID: No measurable stenosis or dissection. Mild bifurcation calcifications.    LEFT CAROTID: No measurable stenosis or dissection.    VERTEBRAL ARTERIES: No focal stenosis or dissection. Balanced vertebral arteries.    AORTIC ARCH: Classic aortic arch anatomy with no significant stenosis at the origin of the great vessels.    NONVASCULAR STRUCTURES:  Unremarkable soft tissues. Status post ACDF C6-C7. Status post interbody disc grafts with dense bony ankylosis C4-C5 and C5-C6.      Impression    IMPRESSION:   HEAD CTA:   1.  Normal CTA [place].    NECK CTA:  1.  No hemodynamically significant stenosis in the neck vessels.   2.  No evidence for dissection.       ED MEDICATIONS:   Medications   sodium chloride (PF) 0.9% PF flush 3 mL (3 mLs Intracatheter Not Given 12/23/22 2213)   sodium chloride (PF) 0.9% PF flush 3 mL (has no administration in time range)   LORazepam (ATIVAN) injection 0.5 mg (has no administration in time range)   iopamidol (ISOVUE-370) solution 100 mL (54 mLs Intravenous Given 12/23/22 2156)   sodium chloride 100mL for CT scan flush use (90 mLs Intravenous Given 12/23/22 2155)   iopamidol (ISOVUE-370) solution 100 mL (75 mLs Intravenous Given 12/23/22 2132)   sodium chloride 100mL for CT scan flush use (75 mLs Intravenous Given 12/23/22 2156)         Impression:    ICD-10-CM    1. Cervical radiculopathy  M54.12     left, with tension headaches      2. Pre-syncope  R55           Plan:    Pending studies include MRI of the cervical brain and brain, both of which were normal.  Upon repeat evaluation patient stressed her symptoms of presyncope were also troublesome and worsening.  CT angio of the chest head neck obtained which revealed no evidence of any acute process.  Case discussed with [facility] hospital service at [facility] will be made to admit for neurology consult..        MD Skyler Mandujano, Theron FELDER MD  12/24/22 [id number]

## 2022-12-24 NOTE — PLAN OF CARE
"Goal Outcome Evaluation:  Observation Goals:     - Diagnostic tests and consults completed and resulted - Not met  - No further episodes of syncope and any new arrhythmia addressed with controlled heart rates - Met  - Vital signs normal or at patient baseline and orthostatic vitals are normal and patient not lightheaded with standing - Met /68 (BP Location: Left arm)   Pulse 76   Temp 97.7  F (36.5  C)   Resp 11   Ht 1.575 m (5' 2\")   Wt 81.1 kg (178 lb 12.8 oz)   SpO2 98%   BMI 32.70 kg/m    - Tolerating oral intake to maintain hydration - Met  - Safe disposition plan has been identified - Not met    Pt refused orthostatic BP.                "

## 2022-12-24 NOTE — ED NOTES
Pt transported to [facility] via EMS. Report given to EMS and accepting RN. Pt aware. Pt A&O x4, ambulatory.
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
## 2022-12-24 NOTE — PLAN OF CARE
"Goal Outcome Evaluation:       - Diagnostic tests and consults completed and resulted - Not met  - No further episodes of syncope and any new arrhythmia addressed with controlled heart rates - Met  - Vital signs normal or at patient baseline and orthostatic vitals are normal and patient not lightheaded with standing - Met /80 (BP Location: Left arm)   Pulse 75   Temp 97.5  F (36.4  C) (Oral)   Resp 18   Ht 1.575 m (5' 2\")   Wt 81.1 kg (178 lb 12.8 oz)   SpO2 97%   BMI 32.70 kg/m     - Tolerating oral intake to maintain hydration - Met  - Safe disposition plan has been identified - Not met                 "

## 2022-12-25 ENCOUNTER — APPOINTMENT (OUTPATIENT)
Dept: CARDIOLOGY | Facility: CLINIC | Age: 62
End: 2022-12-25
Attending: PHYSICIAN ASSISTANT
Payer: COMMERCIAL

## 2022-12-25 VITALS
DIASTOLIC BLOOD PRESSURE: 83 MMHG | SYSTOLIC BLOOD PRESSURE: 127 MMHG | WEIGHT: 178.8 LBS | BODY MASS INDEX: 32.9 KG/M2 | RESPIRATION RATE: 16 BRPM | HEIGHT: 62 IN | HEART RATE: 70 BPM | OXYGEN SATURATION: 96 % | TEMPERATURE: 98.4 F

## 2022-12-25 LAB
ANION GAP SERPL CALCULATED.3IONS-SCNC: 12 MMOL/L (ref 7–15)
BUN SERPL-MCNC: 11 MG/DL (ref 8–23)
CALCIUM SERPL-MCNC: 9.2 MG/DL (ref 8.8–10.2)
CHLORIDE SERPL-SCNC: 106 MMOL/L (ref 98–107)
CREAT SERPL-MCNC: 0.79 MG/DL (ref 0.51–0.95)
DEPRECATED HCO3 PLAS-SCNC: 21 MMOL/L (ref 22–29)
ERYTHROCYTE [DISTWIDTH] IN BLOOD BY AUTOMATED COUNT: 12.7 % (ref 10–15)
GFR SERPL CREATININE-BSD FRML MDRD: 85 ML/MIN/1.73M2
GLUCOSE SERPL-MCNC: 126 MG/DL (ref 70–99)
HCT VFR BLD AUTO: 40.9 % (ref 35–47)
HGB BLD-MCNC: 13.3 G/DL (ref 11.7–15.7)
LVEF ECHO: NORMAL
MCH RBC QN AUTO: 30.4 PG (ref 26.5–33)
MCHC RBC AUTO-ENTMCNC: 32.5 G/DL (ref 31.5–36.5)
MCV RBC AUTO: 93 FL (ref 78–100)
PLATELET # BLD AUTO: 297 10E3/UL (ref 150–450)
POTASSIUM SERPL-SCNC: 3.7 MMOL/L (ref 3.4–5.3)
RBC # BLD AUTO: 4.38 10E6/UL (ref 3.8–5.2)
SODIUM SERPL-SCNC: 139 MMOL/L (ref 136–145)
WBC # BLD AUTO: 6.7 10E3/UL (ref 4–11)

## 2022-12-25 PROCEDURE — 36415 COLL VENOUS BLD VENIPUNCTURE: CPT | Performed by: PHYSICIAN ASSISTANT

## 2022-12-25 PROCEDURE — 93306 TTE W/DOPPLER COMPLETE: CPT

## 2022-12-25 PROCEDURE — 93306 TTE W/DOPPLER COMPLETE: CPT | Mod: 26 | Performed by: INTERNAL MEDICINE

## 2022-12-25 PROCEDURE — 85027 COMPLETE CBC AUTOMATED: CPT | Performed by: PHYSICIAN ASSISTANT

## 2022-12-25 PROCEDURE — 80048 BASIC METABOLIC PNL TOTAL CA: CPT | Performed by: PHYSICIAN ASSISTANT

## 2022-12-25 PROCEDURE — G0378 HOSPITAL OBSERVATION PER HR: HCPCS

## 2022-12-25 PROCEDURE — 250N000013 HC RX MED GY IP 250 OP 250 PS 637: Performed by: NURSE PRACTITIONER

## 2022-12-25 PROCEDURE — 99217 PR OBSERVATION CARE DISCHARGE: CPT | Performed by: PHYSICIAN ASSISTANT

## 2022-12-25 PROCEDURE — 99233 SBSQ HOSP IP/OBS HIGH 50: CPT | Mod: GC | Performed by: PSYCHIATRY & NEUROLOGY

## 2022-12-25 RX ADMIN — LEVOTHYROXINE SODIUM 75 MCG: 25 TABLET ORAL at 06:26

## 2022-12-25 ASSESSMENT — ACTIVITIES OF DAILY LIVING (ADL)
ADLS_ACUITY_SCORE: 31

## 2022-12-25 NOTE — PLAN OF CARE
Observation goals  PRIOR TO DISCHARGE        Comments: List all  goals to be met before discharge:     - Diagnostic tests and consults completed and   resulted: Not Met    - No further episodes of syncope and any new arrhythmia addressed with controlled heart rates: Met     - Vital signs normal or at patient baseline and   orthostatic vitals are normal and patient not: Met   lightheaded with standing: Met    - Tolerating oral intake to maintain hydration: Met     - Safe disposition plan has been identified: Not Met     - Nurse to notify provider when observation goals have been met and patient is ready for discharge.

## 2022-12-25 NOTE — PROGRESS NOTES
Goal Outcome Evaluation:     Observation Goals:     - Diagnostic tests and consults completed and resulted - Not met  - No further episodes of syncope and any new arrhythmia addressed with controlled heart rates - Met  - Vital signs normal or at patient baseline and orthostatic vitals are normal and patient not lightheaded with standing - Met- Tolerating oral intake to maintain hydration - Met  - Safe disposition plan has been identified - Not met

## 2022-12-25 NOTE — DISCHARGE SUMMARY
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  ______________________________________________________________________________  Left Ventricle  Left ventricular size is normal. Left ventricular wall thickness is normal.  Global and regional left ventricular function is normal with an EF of 60-65%.  Left ventricular diastolic function is normal.     Right Ventricle  The right ventricle is normal size. Global right ventricular function is  normal.     Atria  Both atria appear normal.     Mitral Valve  The mitral valve is normal.     Aortic Valve  Aortic valve is normal in structure and function.     Tricuspid Valve  The tricuspid valve is normal. Trace tricuspid insufficiency is present. The  right ventricular systolic pressure is approximated at 25.0 mmHg plus the  right atrial pressure.     Pulmonic Valve  The pulmonic valve is normal.     Vessels  The aorta root is normal. The thoracic aorta is normal. The inferior vena cava  was normal in size with preserved respiratory variability.     Pericardium  No pericardial effusion is present.     Miscellaneous  No significant  valvular abnormalities present.     Compared to Previous Study  Previous study not available for comparison.  ______________________________________________________________________________  MMode/2D Measurements & Calculations  IVSd: 0.87 cm  LVIDd: 4.0 cm  LVIDs: 1.9 cm  LVPWd: 0.77 cm  FS: 53.6 %  LV mass(C)d: 98.8 grams  LV mass(C)dI: 54.3 grams/m2  Ao root diam: 2.8 cm  asc Aorta Diam: 3.0 cm  LVOT diam: 1.8 cm  LVOT area: 2.5 cm2  LA Volume (BP): 38.3 ml     LA Volume Index (BP): 21.0 ml/m2  RWT: 0.38     Doppler Measurements & Calculations  MV E max lindy: 71.5 cm/sec  MV A max lindy: 49.4 cm/sec  MV E/A: 1.4  MV dec time: 0.24 sec  TR max lindy: 250.0 cm/sec  TR max P.0 mmHg  E/E' av.8  Lateral E/e': 5.1  Medial E/e': 8.4     ______________________________________________________________________________  Report approved by: Keren SINGH 2022 10:02 AM         CBC with platelets differential     Status: None    Narrative    The following orders were created for panel order CBC with platelets differential.  Procedure                               Abnormality         Status                     ---------                               -----------         ------                     CBC with platelets and d...[[id number]]                      Final result                 Please view results for these tests on the individual orders.      Recent Results (from the past 48 hour(s))   Cervical spine MRI w/o contrast    Narrative    EXAM: MR BRAIN W/O CONTRAST, MR CERVICAL SPINE W/O CONTRAST  LOCATION: [facility]  DATE/TIME: 2022 5:57 PM    INDICATION: Headache; Acute headache (< 3 months), no complicating features.  TECHNIQUE:   HEAD MRI: Performed without IV contrast.  CERVICAL SPINE MRI: Performed without IV contrast.    COMPARISON: Cervical spine MRI 4/15/2013    FINDINGS:   HEAD MRI:   INTRACRANIAL CONTENTS: No acute or subacute infarct. No mass, acute  hemorrhage, or extra-axial fluid collections. Scattered nonspecific T2/FLAIR hyperintensities within the cerebral white matter most consistent with mild chronic microvascular ischemic   change. Normal ventricles and sulci. Normal position of the cerebellar tonsils. The major intracranial flow voids are unremarkable.    SELLA: No abnormality accounting for technique.    OSSEOUS STRUCTURES/SOFT TISSUES: Unremarkable marrow signal. Unremarkable extracranial soft tissues.     ORBITS: No abnormality accounting for technique.     SINUSES/MASTOIDS: Mild mucosal thickening scattered about the paranasal sinuses. No middle ear or mastoid effusion.    CERVICAL SPINE MRI:   POSTOPERATIVE CHANGE: Interbody fusion at C4-C7. Ventral plate-and-screw construct C6-C7.    ALIGNMENT: Minimal C3-C4 retrolisthesis.    BONES: There is no evidence of a marrow-replacing process. There is no evidence of abnormal bony edema. Vertebral body heights are unremarkable.    DISCS: Postoperative changes as above. Mild C2-C3 and C3-C4 spondylosis.    SPINAL CORD: No convincing signal abnormalities. No evidence for diffusion restriction.    SPINAL CANAL: No high-grade stenosis.    SOFT TISSUES: Unremarkable.    SIGNIFICANT FINDINGS BY LEVEL:  Craniocervical junction and C1-C2: Unremarkable.    C2-C3: Preserved intervertebral disc height. Tiny central protrusion. No facet arthropathy. No significant spinal canal stenosis. No right neural foraminal stenosis. No left neural foraminal stenosis.    C3-C4: Mild intervertebral disc height loss. Shallow central protrusion. Bilateral uncovertebral spurring. Mild left-sided facet arthropathy. Ligamentum flavum thickening. Mild to moderate spinal canal stenosis. No neural foraminal stenosis.    C4-C5: Interbody fusion as above. No posterior disc abnormality. No facet arthropathy. Mild interbody spurring. Mild spinal canal stenosis. No neural foraminal stenosis.    C5-C6: Interbody fusion as above. Mild  left-sided facet arthropathy. No spinal canal stenosis. Mild right and mild left neural foraminal stenosis.     C6-C7: Anterior fusion as above. Mild interbody spurring. No significant facet arthropathy. Mild spinal canal stenosis. No right neural foraminal stenosis. Mild left neural foraminal stenosis.    C7-T1: Normal disc height. No herniation. No facet arthropathy. No spinal canal stenosis. No right neural foraminal stenosis. No left neural foraminal stenosis.    T3-T4: Broad-based central protrusion with mild spinal canal stenosis. Mild facet arthropathy. No significant neural foraminal stenosis.      Impression    CONCLUSION:  HEAD MRI:  1.  No evidence of an acute intracranial abnormality.  2.  Mild presumed chronic small vessel ischemic change.    CERVICAL SPINE MRI:  1.  Postoperative changes of interbody fusion at C4-C7, as well as ventral plate-and-screw fusion at C6-C7.  2.  No convincing cervical cord signal abnormalities.  3.  At C3-C4, there is mild-to-moderate spinal canal stenosis.  4.  Additional degenerative changes as above, including multiple sites of mild foraminal narrowing.   MR Brain w/o Contrast    Narrative    EXAM: MR BRAIN W/O CONTRAST, MR CERVICAL SPINE W/O CONTRAST  LOCATION: [facility]  DATE/TIME: 12/23/2022 5:57 PM    INDICATION: Headache; Acute headache (< 3 months), no complicating features.  TECHNIQUE:   HEAD MRI: Performed without IV contrast.  CERVICAL SPINE MRI: Performed without IV contrast.    COMPARISON: Cervical spine MRI 4/15/2013    FINDINGS:   HEAD MRI:   INTRACRANIAL CONTENTS: No acute or subacute infarct. No mass, acute hemorrhage, or extra-axial fluid collections. Scattered nonspecific T2/FLAIR hyperintensities within the cerebral white matter most consistent with mild chronic microvascular ischemic   change. Normal ventricles and sulci. Normal position of the cerebellar tonsils. The major intracranial flow voids are  unremarkable.    SELLA: No abnormality accounting for technique.    OSSEOUS STRUCTURES/SOFT TISSUES: Unremarkable marrow signal. Unremarkable extracranial soft tissues.     ORBITS: No abnormality accounting for technique.     SINUSES/MASTOIDS: Mild mucosal thickening scattered about the paranasal sinuses. No middle ear or mastoid effusion.    CERVICAL SPINE MRI:   POSTOPERATIVE CHANGE: Interbody fusion at C4-C7. Ventral plate-and-screw construct C6-C7.    ALIGNMENT: Minimal C3-C4 retrolisthesis.    BONES: There is no evidence of a marrow-replacing process. There is no evidence of abnormal bony edema. Vertebral body heights are unremarkable.    DISCS: Postoperative changes as above. Mild C2-C3 and C3-C4 spondylosis.    SPINAL CORD: No convincing signal abnormalities. No evidence for diffusion restriction.    SPINAL CANAL: No high-grade stenosis.    SOFT TISSUES: Unremarkable.    SIGNIFICANT FINDINGS BY LEVEL:  Craniocervical junction and C1-C2: Unremarkable.    C2-C3: Preserved intervertebral disc height. Tiny central protrusion. No facet arthropathy. No significant spinal canal stenosis. No right neural foraminal stenosis. No left neural foraminal stenosis.    C3-C4: Mild intervertebral disc height loss. Shallow central protrusion. Bilateral uncovertebral spurring. Mild left-sided facet arthropathy. Ligamentum flavum thickening. Mild to moderate spinal canal stenosis. No neural foraminal stenosis.    C4-C5: Interbody fusion as above. No posterior disc abnormality. No facet arthropathy. Mild interbody spurring. Mild spinal canal stenosis. No neural foraminal stenosis.    C5-C6: Interbody fusion as above. Mild left-sided facet arthropathy. No spinal canal stenosis. Mild right and mild left neural foraminal stenosis.     C6-C7: Anterior fusion as above. Mild interbody spurring. No significant facet arthropathy. Mild spinal canal stenosis. No right neural foraminal stenosis. Mild left neural foraminal  stenosis.    C7-T1: Normal disc height. No herniation. No facet arthropathy. No spinal canal stenosis. No right neural foraminal stenosis. No left neural foraminal stenosis.    T3-T4: Broad-based central protrusion with mild spinal canal stenosis. Mild facet arthropathy. No significant neural foraminal stenosis.      Impression    CONCLUSION:  HEAD MRI:  1.  No evidence of an acute intracranial abnormality.  2.  Mild presumed chronic small vessel ischemic change.    CERVICAL SPINE MRI:  1.  Postoperative changes of interbody fusion at C4-C7, as well as ventral plate-and-screw fusion at C6-C7.  2.  No convincing cervical cord signal abnormalities.  3.  At C3-C4, there is mild-to-moderate spinal canal stenosis.  4.  Additional degenerative changes as above, including multiple sites of mild foraminal narrowing.   CT Chest Pulmonary Embolism w Contrast    Narrative    EXAM: CT CHEST PULMONARY EMBOLISM W CONTRAST  LOCATION: [facility]  DATE/TIME: 12/23/2022 10:11 PM    INDICATION: Female sex; Not pregnant; No imaging to r o PE in the last 24 hours; Pulmonary Embolism Rule Out Criteria (PERC) score > 0; Revised Hazlet Score (RGS) not >= 11; No D dimer result available; D dimer not ordered, shortness of breath, chest   pain  COMPARISON: None.  TECHNIQUE: CT chest pulmonary angiogram during arterial phase injection of IV contrast. Multiplanar reformats and MIP reconstructions were performed. Dose reduction techniques were used.   CONTRAST: 54mL isovue 370    FINDINGS:  ANGIOGRAM CHEST: Pulmonary arteries are normal caliber and negative for pulmonary emboli. Thoracic aorta is negative for dissection. No CT evidence of right heart strain.    LUNGS AND PLEURA: Lungs are clear. No pleural effusion.    MEDIASTINUM/AXILLAE: No lymphadenopathy. Normal esophagus. No significant pericardial effusion.    CORONARY ARTERY CALCIFICATION: Mild.    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    MUSCULOSKELETAL: No concerning bone lesions.      Impression    IMPRESSION:  1.  No CT evidence of pulmonary embolus to the segmental level   CTA Head Neck with Contrast    Narrative    EXAM: CTA HEAD NECK W CONTRAST  LOCATION: [facility]  DATE/TIME: 12/23/2022 10:14 PM    INDICATION: Neck pain; Neck pain, no complicating feature; No chronic neck pain >= 3 months  COMPARISON: None.  CONTRAST: 75mL isovue 370  TECHNIQUE: Head and neck CT angiogram with IV contrast. Axial helical CT images of the head and neck vessels obtained during the arterial phase of intravenous contrast administration. Axial 2D reconstructed images and multiplanar 3D MIP reconstructed   images of the head and neck vessels were performed by the technologist. Dose reduction techniques were used. All stenosis measurements made according to NASCET criteria unless otherwise specified.    FINDINGS:   HEAD CTA:  ANTERIOR CIRCULATION: No stenosis/occlusion, aneurysm, or high flow vascular malformation. Fetal origin of the right posterior cerebral artery from the anterior circulation.    POSTERIOR CIRCULATION: No stenosis/occlusion, aneurysm, or high flow vascular malformation. Balanced vertebral arteries supply a normal basilar artery.     DURAL VENOUS SINUSES: Expected enhancement of the major dural venous sinuses.    NECK CTA:  RIGHT CAROTID: No measurable stenosis or dissection. Mild bifurcation calcifications.    LEFT CAROTID: No measurable stenosis or dissection.    VERTEBRAL ARTERIES: No focal stenosis or dissection. Balanced vertebral arteries.    AORTIC ARCH: Classic aortic arch anatomy with no significant stenosis at the origin of the great vessels.    NONVASCULAR STRUCTURES: Unremarkable soft tissues. Status post ACDF C6-C7. Status post interbody disc grafts with dense bony ankylosis C4-C5 and  C5-C6.      Impression    IMPRESSION:   HEAD CTA:   1.  Normal CTA [place].    NECK CTA:  1.  No hemodynamically significant stenosis in the neck vessels.   2.  No evidence for dissection.   CTV Head Neck w Contrast    Narrative    EXAM: CTV HEAD NECK W CONTRAST, 12/24/2022 3:40 PM    HISTORY:  Pt with near syncope, headache, redness of face/nausea,  recent cervical surgery. TO rule out thoracic outlet/ IJVT/ other  causes..    COMPARISON:  CTA of the head and neck 12/23/2022      TECHNIQUE: Helically acquired thin section axial CT images were  obtained with 1 mm collimation through the brain after intravenous  bolus injection of iodinated contrast medium with a delay between  administration of contrast and scanning. Image data were sent to the  3D workstation and postprocessed by the technologist and radiologist   using maximum intensity pixel (MIP), multiplanar and volume rendered  3D reconstruction programs.     CONTRAST: 75 cc Isovue-370.    FINDINGS:  No thrombosis or stenosis of the major intracranial dural  sinuses or deep cerebral veins.   The right styloid process measures 23.8 mm. The left styloid process  measures 22.5 mm.  There is mild compression of the right internal jugular vein at the  right styloid process. No internal jugular venous thrombosis  bilaterally. No evidence of compression of the bilateral subclavian  veins and neutral position. The superior vena cava is patent. No  central pulmonary embolus.    Minimal mucosal thickening of the right maxillary sinus.. No  periapical dental lucencies. The imaged skull base, intracranial and  orbital structures are within normal limits. No suspicious finding in  the visualized superior mediastinum/thorax. Clear lung apices.  Postsurgical changes of anterior cervical spinal fixation of C4-7,  with removal of hardware at C4-5.      Impression    IMPRESSION:   1. Patent dural venous sinuses and major deep intracranial veins.  2. Patent internal  jugular veins and superior vena cava.  3. No evidence of bilateral subclavian venous compression in neutral  position. Physiologic compression of the subclavian veins may be  better evaluated with dedicated thoracic outlet ultrasound, an  outpatient basis, if clinically indicated.    I have personally reviewed the examination and initial interpretation  and I agree with the findings.    LISSA PEGUERO MD         SYSTEM ID:  [id number]   Echocardiogram Complete   Result Value    LVEF  60-65%    Narrative    [id number]  [id number]  [id number]  [id number]^ROWENA^DELIA^ALMA     [facility],[place]  Echocardiography Laboratory  [address] [place] [id number]     Name: BIJU OATES  MRN: [id number]  : 1960  Study Date: 2022 07:48 AM  Age: 62 yrs  Gender: Female  Patient Location: [facility]  Reason For Study: Syncope  Ordering Physician: DELIA GUAMAN     BSA: 1.8 m2  Height: 62 in  Weight: 178 lb  BP: 123/68 mmHg  ______________________________________________________________________________  ______________________________________________________________________________  Interpretation Summary  Global and regional left ventricular function is normal with an EF of 60-65%.  Global right ventricular function is normal.  No significant valvular abnormalities present.  No pericardial effusion is present.  Previous study not available for comparison.  ______________________________________________________________________________  Left Ventricle  Left ventricular size is normal. Left ventricular wall thickness is normal.  Global and regional left ventricular function is normal with an EF of 60-65%.  Left ventricular diastolic function is normal.     Right Ventricle  The right ventricle is normal size. Global right ventricular function is  normal.     Atria  Both atria appear normal.     Mitral Valve  The mitral valve is normal.     Aortic Valve  Aortic valve is normal in  structure and function.     Tricuspid Valve  The tricuspid valve is normal. Trace tricuspid insufficiency is present. The  right ventricular systolic pressure is approximated at 25.0 mmHg plus the  right atrial pressure.     Pulmonic Valve  The pulmonic valve is normal.     Vessels  The aorta root is normal. The thoracic aorta is normal. The inferior vena cava  was normal in size with preserved respiratory variability.     Pericardium  No pericardial effusion is present.     Miscellaneous  No significant valvular abnormalities present.     Compared to Previous Study  Previous study not available for comparison.  ______________________________________________________________________________  MMode/2D Measurements & Calculations  IVSd: 0.87 cm  LVIDd: 4.0 cm  LVIDs: 1.9 cm  LVPWd: 0.77 cm  FS: 53.6 %  LV mass(C)d: 98.8 grams  LV mass(C)dI: 54.3 grams/m2  Ao root diam: 2.8 cm  asc Aorta Diam: 3.0 cm  LVOT diam: 1.8 cm  LVOT area: 2.5 cm2  LA Volume (BP): 38.3 ml     LA Volume Index (BP): 21.0 ml/m2  RWT: 0.38     Doppler Measurements & Calculations  MV E max lindy: 71.5 cm/sec  MV A max lindy: 49.4 cm/sec  MV E/A: 1.4  MV dec time: 0.24 sec  TR max lindy: 250.0 cm/sec  TR max P.0 mmHg  E/E' av.8  Lateral E/e': 5.1  Medial E/e': 8.4     ______________________________________________________________________________  Report approved by: Keren SINGH 2022 10:02 AM                      Pending Results:   Unresulted Labs Ordered in the Past 30 Days of this Admission     No orders found from 2022 to 2022.                  Discharge Instructions and Follow-Up:     Discharge Procedure Orders   Adult Neurology  Referral   Standing Status: Future   Referral Priority: Routine: Next available opening Referral Type: Consultation   Number of Visits Requested: 1     Reason for your hospital stay   Order Comments: Presyncope   Tingling and numbness of face and left arm     Activity   Order Comments:  Your activity upon discharge: activity as tolerated     Order Specific Question Answer Comments   Is discharge order? Yes      Follow Up and recommended labs and tests   Order Comments: Follow up with Dr. Spears at the [facility]     When to contact your care team   Order Comments: Return to the ED with fever, uncontrolled nausea, vomiting, unrelieved pain,  dizziness, chest pain, shortness of breath, loss of consciousness, and any new or concerning symptoms.     Discharge Instructions   Order Comments: - CT head/neck and MRI of the head and neck were all negative.  - Echocardiogram and cardiac monitoring were unremarkable  - CTV head and neck unremarkable for acute obstruction   - Follow up with Dr. Spears at the [facility] (ordered  - Resume home medications as before  - Follow up with your primary as discussed   - Return if having worsening symptoms.     Full Code     Order Specific Question Answer Comments   Code status determined by: Discussion with patient/ legal decision maker      Diet   Order Comments: Follow this diet upon discharge: Regular     Order Specific Question Answer Comments   Is discharge order? Yes           Attestation:  Ifeoma Velasquez PA-C.
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
## 2022-12-26 LAB
ATRIAL RATE - MUSE: 74 BPM
DIASTOLIC BLOOD PRESSURE - MUSE: NORMAL MMHG
INTERPRETATION ECG - MUSE: NORMAL
P AXIS - MUSE: 36 DEGREES
PR INTERVAL - MUSE: 164 MS
QRS DURATION - MUSE: 80 MS
QT - MUSE: 406 MS
QTC - MUSE: 450 MS
R AXIS - MUSE: 6 DEGREES
SYSTOLIC BLOOD PRESSURE - MUSE: NORMAL MMHG
T AXIS - MUSE: 23 DEGREES
VENTRICULAR RATE- MUSE: 74 BPM

## 2022-12-26 NOTE — TELEPHONE ENCOUNTER
Called patient and offered an appointment with JASS in neurosurgery if she wanted to schedule.  Patient is going to proceed with her appointment with neurology at this time.

## 2022-12-27 NOTE — TELEPHONE ENCOUNTER
FUTURE VISIT INFORMATION      FUTURE VISIT INFORMATION:    Date: 1/4/2023    Time: 3pm    Location[place] [facility] [place]  REFERRAL INFORMATION:    Referring provider:  Dr. Chavez     Referring providers clinic:  [facility]     Reason for visit/diagnosis  Radiculopathy     RECORDS REQUESTED FROM:       Clinic name Comments Records Status Imaging Status   [facility]   Scanned to chart No Images          Internal ED Visit/Admission-12/23/2022    MR Cervical Spine-12/23/2022    MR Brain-12/23/2022    CTA Head/Neck-12/23/2022    CTV Head/Neck-12/24/2022 Epic PACS         [facility]   Scanned to chart PACS                RX PROGRESS NOTE: Vancomycin Therapeutic Drug Monitoring      Indication for therapy: Concern for viral meningitis    ALLERGIES:  No Known Allergies    Most recent height and weight information:  Weight: 119.5 kg (07/25/18 1604)  Height: 5' 6\" (167.6 cm) (07/25/18 1643)    The Following are the calculated  Current Weights for Kimani Patel     Adjusted Ideal        83.4 kg 59.3 kg              Labs:  Serum Creatinine and Creatinine Clearance:  Serum creatinine: 1.04 mg/dL (H) 07/25/18 0925  Estimated creatinine clearance: 55.9 mL/min (A)    Maximum Temperature (last 24 hours)     Value Max    Temp 100.4 °F (38 °C)        WBC (K/mcL)   Date/Time Value   07/25/2018 0925 6.9     Microbiology Results  (Last 10 results in the past 7 days)    Specimen Gram Smear Culture Result Status      07/25/18  1307 07/25/18  1307 07/25/18  1307 07/25/18  1307     CEREBROSPINAL FLUID MANY POLYMORPHONUCLEAR CELLS PENDING PENDING      NO ORGANISMS SEEN        SLIDE PREPARED BY CYTOSPIN        PRELIMINARY REPORT TO BE REVIEWED BY [facility]               Assessment/Plan:  Briefly, this is a 59 year old female started on vancomycin for concern for viral meningitis, with a target serum trough concentration of 15-20 mcg/mL.  Patient received a dose of vancomycin 1,000 mg at 1728.  Initial dosing regimen will be a catch up dose of vancomycin 500 mg followed by an additional 1,500 mg x 1 to complete the loading dose. Followed by a maintenance dose of 750 mg every 12 hours.    Pharmacy will monitor levels as appropriate.    Pharmacy will continue to monitor patient (renal function, microbiology data, risk factors for adverse events, appropriate duration of therapy), will order/monitor serum levels as appropriate, and will adjust dose if/when necessary.      Thank you,    Anais Machuca RPH  7/25/2018 6:59 PM

## 2023-01-04 ENCOUNTER — PRE VISIT (OUTPATIENT)
Dept: NEUROLOGY | Facility: CLINIC | Age: 63
End: 2023-01-04

## 2023-01-04 ENCOUNTER — OFFICE VISIT (OUTPATIENT)
Dept: NEUROLOGY | Facility: CLINIC | Age: 63
End: 2023-01-04
Attending: STUDENT IN AN ORGANIZED HEALTH CARE EDUCATION/TRAINING PROGRAM
Payer: COMMERCIAL

## 2023-01-04 VITALS
OXYGEN SATURATION: 97 % | SYSTOLIC BLOOD PRESSURE: 131 MMHG | RESPIRATION RATE: 16 BRPM | HEART RATE: 73 BPM | DIASTOLIC BLOOD PRESSURE: 84 MMHG

## 2023-01-04 DIAGNOSIS — G24.3 CERVICAL DYSTONIA: Primary | ICD-10-CM

## 2023-01-04 DIAGNOSIS — I87.1 COMPRESSION OF VEIN: ICD-10-CM

## 2023-01-04 DIAGNOSIS — R55 PRE-SYNCOPE: ICD-10-CM

## 2023-01-04 DIAGNOSIS — I77.89 PECTORALIS MINOR SYNDROME (H): ICD-10-CM

## 2023-01-04 DIAGNOSIS — M47.812 CERVICAL SPONDYLOSIS WITHOUT MYELOPATHY: ICD-10-CM

## 2023-01-04 DIAGNOSIS — M54.12 CERVICAL RADICULOPATHY: ICD-10-CM

## 2023-01-04 DIAGNOSIS — G54.0 TOS (THORACIC OUTLET SYNDROME): ICD-10-CM

## 2023-01-04 PROCEDURE — 99205 OFFICE O/P NEW HI 60 MIN: CPT | Performed by: PSYCHIATRY & NEUROLOGY

## 2023-01-04 ASSESSMENT — PAIN SCALES - GENERAL: PAINLEVEL: MILD PAIN (3)
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
## 2023-01-06 ENCOUNTER — TELEPHONE (OUTPATIENT)
Dept: NEUROLOGY | Facility: CLINIC | Age: 63
End: 2023-01-06

## 2023-01-06 NOTE — TELEPHONE ENCOUNTER
Called pt back and informed her Dr. Spears's 1/4/23 clinic  note stated follow up in 4 months.  1. Advised pt to contact our schedulers. Clinic # given. Advised her to have an appt on the books and then ask to be placed on a wait list so if a sooner appt becomes avail she will be called; however, that is no guarantee and she verbally understood.2.  Pt stated she scheduled the ultra sound and her EMG.  She stated she is waiting for a call to schedule the 2 separate muscle blocks. Informed her I will forward the message to scheduling and she verbally understood.  3. Educated pt on my chart and pt did not want my chart.  Advised her to call after each test to request the results. Informed her she can call the same clinic # that I gave her. 4. Pt stated she wanted this in her chart.  In  2000 or 2001 she saw a dermatologist as her face and neck were red all the time and veins showing up. She stated she had a laser treatment on one side of her face, then had a laser treatment on the other side of her face and this diminished, eliminated the vessels in her face.  Informed her I will forward this information to Dr. Spears.

## 2023-01-06 NOTE — TELEPHONE ENCOUNTER
Received a message to contact pt at 1 pm. I called pt and she asked that I call her back at 1pm.  Informed her I will call her back.

## 2023-01-09 NOTE — TELEPHONE ENCOUNTER
"Called pt and informed her Dr. Spears's note as written below:     Per Dr. Spears,  The doppler effect is when a wave function looks to be getting faster or slower depending on whether it is coming toward you (seems faster) or away from you (seems slower). Ultrasounds can measure the velocity of blood flow by picking up the doppler effect in blood. A doppler ultrasound is one that looks at blood flow and. Her scheduled ultrasound is a doppler ultrasound because we're specifically looking at blood flow, which is why I ordered it. A non-doppler ultrasound would be like a solid organ ultrasound. If the machine has doppler capabilities, you can turn on the doppler function on the ultrasound to look at blood flow through the organ. In practical terms, all of our ultrasounds to some degree use the doppler function on the machine. The ultrasounds are not labeled as \"doppler,\" or \"non-doppler,\" as these are fundmental functions of ultrasound machines and the techs use the protocols needed to answer the question posed in the order.   In the case of the study I ordered, they will ONLY be looking at the doppler effect because we are only interested in blood flow and the test is already long enough without interrogating all the solid organs in the neck. I am not sure where she got the idea that the test that I had ordered would not have been ordered correctly but she can rest assured that the test is ordered correctly. She only needs to be sure that she is scheduled at the [facility] for it since only they do the more comprehensive protocol for the information we need in the neck.     Pt verbally understood and appreciated the call.      "

## 2023-01-09 NOTE — TELEPHONE ENCOUNTER
Called pt back and informed her regarding the block, Dr. Spears will need to review the ultrasound results first and that is how she knows what to request for injections so the referral to Physcial medicine and rehab will happen after she sees the ultrasound results and pt understood. The referrals are to Dr.Mahmood Haro and Dr. Spears will be doing the injections once she gets a little more training and clearance.   Advised pt to please get set up for my chart so that we can communicate easier and she will be able to ask the question and then read the response. Gave pt telephone # to my chart support line.  Pt wants a doppler ultrasound if it is not already in the order.  Informed her I will send a note to Dr. Spears that you are requesting dopper ultrasound if it is not in the order.

## 2023-01-13 ENCOUNTER — ANCILLARY PROCEDURE (OUTPATIENT)
Dept: ULTRASOUND IMAGING | Facility: CLINIC | Age: 63
End: 2023-01-13
Attending: PSYCHIATRY & NEUROLOGY
Payer: COMMERCIAL

## 2023-01-13 DIAGNOSIS — I87.1 COMPRESSION OF VEIN: ICD-10-CM

## 2023-01-13 DIAGNOSIS — G54.0 TOS (THORACIC OUTLET SYNDROME): ICD-10-CM

## 2023-01-13 PROCEDURE — 93922 UPR/L XTREMITY ART 2 LEVELS: CPT | Performed by: RADIOLOGY

## 2023-01-13 PROCEDURE — 93970 EXTREMITY STUDY: CPT | Performed by: RADIOLOGY

## 2023-01-18 ENCOUNTER — TELEPHONE (OUTPATIENT)
Dept: NEUROLOGY | Facility: CLINIC | Age: 63
End: 2023-01-18
Payer: COMMERCIAL

## 2023-01-18 DIAGNOSIS — M47.812 CERVICAL SPONDYLOSIS WITHOUT MYELOPATHY: ICD-10-CM

## 2023-01-18 DIAGNOSIS — I87.1 COMPRESSION OF VEIN: Primary | ICD-10-CM

## 2023-01-18 DIAGNOSIS — R55 PRE-SYNCOPE: ICD-10-CM

## 2023-01-18 NOTE — TELEPHONE ENCOUNTER
Per Dr. Spears, called pt and informed her Dr. Spears's note/result/plan verbatim:    This patient seems to not have MyChart.  She should be encouraged to start a MyChart account.  Would you please pass on this message to her?   I looked at her ultrasound and there are a couple high positions in which the flow in the internal jugular veins drops.  We should follow-up with a CT venogram of the head and neck similar to what she had in the hospital but with the head turn right and head turn left.  [facility]  The order is made.  After the study we will probably schedule her for a diagnostic lidocaine block in the neck to see whether relaxing a couple of the muscles of the neck helps her symptoms.  For now the priority is to get that CT venogram to look at the association between the muscles in the veins in the neck to be clear about what would be injecting.  Thanks very much. [facility]             Encouraged pt to sign up for my chart as that is how  Dr. Spears corresponds to patients. Pt stated she does not want my chart. Gave pt result and plan and gave her imaging # to call and schedule CT venogram.

## 2023-01-27 ENCOUNTER — ANCILLARY PROCEDURE (OUTPATIENT)
Dept: CT IMAGING | Facility: CLINIC | Age: 63
End: 2023-01-27
Attending: PSYCHIATRY & NEUROLOGY
Payer: COMMERCIAL

## 2023-01-27 DIAGNOSIS — R55 PRE-SYNCOPE: ICD-10-CM

## 2023-01-27 DIAGNOSIS — I87.1 COMPRESSION OF VEIN: ICD-10-CM

## 2023-01-27 DIAGNOSIS — M47.812 CERVICAL SPONDYLOSIS WITHOUT MYELOPATHY: ICD-10-CM

## 2023-01-27 PROCEDURE — 70496 CT ANGIOGRAPHY HEAD: CPT | Mod: GC | Performed by: RADIOLOGY

## 2023-01-27 PROCEDURE — 70498 CT ANGIOGRAPHY NECK: CPT | Mod: GC | Performed by: RADIOLOGY

## 2023-01-27 RX ORDER — IOPAMIDOL 755 MG/ML
75 INJECTION, SOLUTION INTRAVASCULAR ONCE
Status: COMPLETED | OUTPATIENT
Start: 2023-01-27 | End: 2023-01-27

## 2023-01-27 RX ADMIN — IOPAMIDOL 75 ML: 755 INJECTION, SOLUTION INTRAVASCULAR at 16:52

## 2023-02-02 ENCOUNTER — TELEPHONE (OUTPATIENT)
Dept: NEUROLOGY | Facility: CLINIC | Age: 63
End: 2023-02-02
Payer: COMMERCIAL

## 2023-02-02 NOTE — TELEPHONE ENCOUNTER
[facility] Call Center    Phone Message    May a detailed message be left on voicemail: yes     Reason for Call:  Patient called states that she has not heard back regarding her CT results, patient would like a call back from .    Action Taken: Message routed to:  [facility] ([facility]    Travel Screening: Not Applicable

## 2023-02-03 NOTE — TELEPHONE ENCOUNTER
"Called pt and encouraged her to sign up for my chart to avoid any miscommunication and she verbally understood.  Informed her Dr. Spears's note verbatim:      \"The CT venogram showed that with head turning the internal jugular vein is compressed by a muscle in the neck called the sternocleidomastoid muscle. The muscle is the main neck muscle that is involved in head rotation.  The right internal jugular vein is compressed by the right sternocleidomastoid muscle with rightward head turning.  Vice versa for the left internal jugular vein.  The internal jugular vein is the major vein that returns blood from the brain back to the heart.  When this vein is compressed it can cause elevated pressure in the head and pressure in the ear which can trigger dizziness of various types.  It is also associated with neck pain.  While the CT venogram was done in the head rotation position it is also possible that this vein gets compressed with head extension and head flexion.     In order to show that this compression is related to her symptoms what we generally do is a trial muscle block of injecting some anesthetic into the muscle to relax it for about 4 to 5 hours and then observing whether it improves the patient's symptoms.  We do this injection in the office under ultrasound guidance.  It is generally very well-tolerated with the usual caveats that since a needle is involved there is some needle discomfort, bruising, and redness from the injection.  There can also be some temporary lightheadedness and shortness of breath.  If the lidocaine injection is helpful, then we determine whether insurance will cover Botox.  This can be hit or miss but the diagnostic muscle block is helpful regardless.     The other possibility is to try physical therapy again but with specific attention paid to the muscles in the neck involved as well as posture.  If that is not helpful then we can go down the injection pathway. I would be happy to start " "with either according to her preference.\"          "

## 2023-02-03 NOTE — TELEPHONE ENCOUNTER
After informing pt result per Dr. Spears as listed below, pt wanted me to let Dr. Spears know that she has done years and years of PT with no positive results and she is not interested in botox as botox has too many side effects and she thinks it is temporary and wants a long term solution from Dr. Spears.  Informed her I will pass this along to Dr. Spears.

## 2023-02-06 NOTE — TELEPHONE ENCOUNTER
Called pt and informed her Dr. Spears's note verbatim as written below:     I understand these kinds of concerns and she is frustrated, but the problems that she has don't have an easy solution as they have been developing over a long time. The PT would be focused on a specific area of the thoracic outlet that would not have been worked on before and is worth a trial. While I understand the concerns with Botox, we've also been using Botox very safely and successfully for thousands of patients a year in our service-line.  It's one of the best ways to relax muscles after physical therapy. The diagnostic test with the lidocaine, even though it will have short term effects, can still be helpful in figuring out the relevant components of what is causing the symptoms. It's possible that we might not have a solution to her problem but we can take steps to better understand what is driving them. Therefore, I would still suggest doing PT, the lidocaine trial, and then determining whether insurance will cover Botox. I do think that we can make some headway with her symptoms even if they are not fully relieved going down this pathway. She can take some time to think about it and let us know when she is ready to go forward.     Informed pt this is the treatment plan that Dr. Spears has outlined and she can call us back or get set up on my chart and send us a my chart message when she is ready to move forward.  Pt asked for the telephone # obtain her imaging reports.  Gave pt the phone # to  medical records. Again, informed pt to let us know if she would like to move forward with Dr. Spears's plan and she verbally understood.

## 2023-02-14 ENCOUNTER — OFFICE VISIT (OUTPATIENT)
Dept: NEUROLOGY | Facility: CLINIC | Age: 63
End: 2023-02-14
Payer: COMMERCIAL

## 2023-02-14 DIAGNOSIS — M54.12 CERVICAL RADICULOPATHY: ICD-10-CM

## 2023-02-14 DIAGNOSIS — G54.0 TOS (THORACIC OUTLET SYNDROME): ICD-10-CM

## 2023-02-14 DIAGNOSIS — M47.812 CERVICAL SPONDYLOSIS WITHOUT MYELOPATHY: ICD-10-CM

## 2023-02-14 PROCEDURE — 95910 NRV CNDJ TEST 7-8 STUDIES: CPT | Performed by: PSYCHIATRY & NEUROLOGY

## 2023-02-14 PROCEDURE — 95886 MUSC TEST DONE W/N TEST COMP: CPT | Performed by: PSYCHIATRY & NEUROLOGY

## 2023-02-14 NOTE — LETTER
2023       RE: Christa Huang  [address] [facility] [address]     Dear Colleague,    Thank you for referring your patient, Christa Huang, to the [facility] EMG CLINIC [place] at [facility]. Please see a copy of my visit note below.                        [facility]  Electrodiagnostic Laboratory                 Department of Neurology                                                                                                         Test Date:  2023    Patient: Christa Huang : 1960 Physician: Tyrell Louie MD   Sex: Female AGE: 62 year Ref Phys: Avtar Spears MD   ID#: [id number]   Technician: Diana Taylor     Clinical Information:    62 year old woman with history of two previous cervical spine fusion surgeries (C4-6 in , followed by removal of hardware and additional C6-7 anterior fusion in 10/2022) who reports paresthesias in the left arm from the neck to the wrist, affected by neck lateral movements. There is clinical suspicion for neurogenic thoracic outlet syndrome. EMG of the left upper extremity requested to evaluate for active cervical radiculopathies vs brachial plexopathy vs entrapment neuropathies. There is no previous EMG study available for comparison.     Techniques:    Motor and sensory conduction studies were done with surface recording electrodes. EMG was done with a concentric needle electrode.     Results:    Left median (APB) and ulnar (ADM) motor NCSs were normal. Left median, ulnar, radial, and bilateral medial antebrachial cutaneous antidromic sensory NCSs were normal. Side to side comparison of MAC sensory amplitudes did not disclose a major difference. Needle EMG of the left biceps and brachioradialis, showed no abnormal spontaneous activity, but there were some large, long duration MUPs in both muscles, with either normal (brachioradialis) or mildly reduced (biceps  "brachii) recruitment patterns. EMG of the following muscles was normal: Left APB, FDI, triceps, pronator teres, and deltoid.     Interpretation:    Mildly abnormal study. There is electrodiagnostic evidence of mild, chronic, inactive left C5-6 radiculopathy. There is no electrodiagnostic evidence of lower trunk brachial plexopathy as seen in neurogenic TOS, left carpal tunnel syndrome, or ulnar neuropathy. Please see comment.    Comment: It was explained to the patient that this result is essentially \"non-diagnostic\" for her chief complaint, because a) mild changes of chronic (remote), inactive radiculopathies can be seen in individuals who have previously undergone cervical spine surgery with complete resolution of their symptoms, b) A negative EMG study does not rule out neurogenic TOS.     ___________________________  Tyrell Louie MD        Nerve Conduction Studies  Motor Sites      Latency Amplitude Neg. Amp Diff Segment Distance Velocity Neg. Dur Neg Area Diff Temperature Comment   Site (ms) Norm (mV) Norm %  cm m/s Norm ms %  C    Left Median (APB) Motor   Wrist 3.8  < 4.4 10.0  > 5.0  Wrist-APB 8   5.1  33.1    Elbow 7.4 - 9.5  > 5.0 -5.0 Elbow-Wrist 21.5 60  > 48 5.4 10.3 33.1    Left Ulnar (ADM) Motor   Wrist 2.3  < 3.5 8.2  > 5.0  Wrist-ADM 8   5.4  32.3    Bel Elbow 5.2 - 5.9 - -28.0 Bel Elbow-Wrist 16.5 57  > 48 6.1 -17.3 32.1    Abv Elbow 6.5 - 5.0 - -15.3 Abv Elbow-Bel Elbow 8 62  > 48 6.4 2.2 31.9      Sensory Sites      Onset Lat Peak Lat Amp (O-P) Amp (P-P) Segment Distance Velocity Temperature Comment   Site ms ms  V Norm  V  cm m/s Norm  C    Left Medial Antebrachial Cutaneous Sensory   Elbow-Med Forearm 1.88 2.5 8 - 8 Elbow-Med Forearm 12.5 66 - 33.6    Right Medial Antebrachial Cutaneous Sensory   Elbow-Med Forearm 1.75 2.3 9 - 14 Elbow-Med Forearm 12.5 71 - 32.2    Left Median Sensory   Wrist-Dig II 2.5 3.2 26  > 10 45 Wrist-Dig II 14 56  > 48 33.1    Left Radial Sensory "   Forearm-Wrist 1.43 1.83 15  > 15 53 Forearm-Wrist 10 70 - 32.6    Left Ulnar Sensory   Wrist-Dig V 2.0 2.7 21  > 8 20 Wrist-Dig V 12.5 63  > 48 33.1        Electromyography     Side Muscle Ins Act Fibs/PSW Fasc HF Amp Dur Poly Recrt Int Pat   Left FDI Nml None Nml 0 Nml Nml 0 Nml Nml   Left Triceps Nml None Nml 0 Nml Nml 0 Nml Nml   Left Pronator Teres Nml None Nml 0 Nml Nml 0 Nml Nml   Left Biceps Nml None Nml 0 1+ 1+ 0 Ashley Nml   Left Deltoid Nml None Nml 0 Nml Nml 0 Nml Nml   Left Brachiorad Nml None Nml 0 1+ 1+ 0 Nml Nml   Left APB Nml None Nml 0 Nml Nml 0 Nml Nml         NCS Waveforms:    Motor         Sensory                    Ultrasound Images:        Sincerely,  Tyrell Louie MD

## 2023-02-14 NOTE — PROGRESS NOTES
[facility]  Electrodiagnostic Laboratory                 Department of Neurology                                                                                                         Test Date:  2023    Patient: Christa Huang : 1960 Physician: Tyrell Louie MD   Sex: Female AGE: 62 year Ref Phys: Avtar Spears MD   ID#: [id number]   Technician: Diana Taylor     Clinical Information:    62 year old woman with history of two previous cervical spine fusion surgeries (C4-6 in , followed by removal of hardware and additional C6-7 anterior fusion in 10/2022) who reports paresthesias in the left arm from the neck to the wrist, affected by neck lateral movements. There is clinical suspicion for neurogenic thoracic outlet syndrome. EMG of the left upper extremity requested to evaluate for active cervical radiculopathies vs brachial plexopathy vs entrapment neuropathies. There is no previous EMG study available for comparison.     Techniques:    Motor and sensory conduction studies were done with surface recording electrodes. EMG was done with a concentric needle electrode.     Results:    Left median (APB) and ulnar (ADM) motor NCSs were normal. Left median, ulnar, radial, and bilateral medial antebrachial cutaneous antidromic sensory NCSs were normal. Side to side comparison of MAC sensory amplitudes did not disclose a major difference. Needle EMG of the left biceps and brachioradialis, showed no abnormal spontaneous activity, but there were some large, long duration MUPs in both muscles, with either normal (brachioradialis) or mildly reduced (biceps brachii) recruitment patterns. EMG of the following muscles was normal: Left APB, FDI, triceps, pronator teres, and deltoid.     Interpretation:    Mildly abnormal study. There is electrodiagnostic evidence of mild, chronic, inactive left C5-6 radiculopathy. There is no electrodiagnostic evidence of lower trunk  "brachial plexopathy as seen in neurogenic TOS, left carpal tunnel syndrome, or ulnar neuropathy. Please see comment.    Comment: It was explained to the patient that this result is essentially \"non-diagnostic\" for her chief complaint, because a) mild changes of chronic (remote), inactive radiculopathies can be seen in individuals who have previously undergone cervical spine surgery with complete resolution of their symptoms, b) A negative EMG study does not rule out neurogenic TOS.     ___________________________  Tyrell Louie MD        Nerve Conduction Studies  Motor Sites      Latency Amplitude Neg. Amp Diff Segment Distance Velocity Neg. Dur Neg Area Diff Temperature Comment   Site (ms) Norm (mV) Norm %  cm m/s Norm ms %  C    Left Median (APB) Motor   Wrist 3.8  < 4.4 10.0  > 5.0  Wrist-APB 8   5.1  33.1    Elbow 7.4 - 9.5  > 5.0 -5.0 Elbow-Wrist 21.5 60  > 48 5.4 10.3 33.1    Left Ulnar (ADM) Motor   Wrist 2.3  < 3.5 8.2  > 5.0  Wrist-ADM 8   5.4  32.3    Bel Elbow 5.2 - 5.9 - -28.0 Bel Elbow-Wrist 16.5 57  > 48 6.1 -17.3 32.1    Abv Elbow 6.5 - 5.0 - -15.3 Abv Elbow-Bel Elbow 8 62  > 48 6.4 2.2 31.9      Sensory Sites      Onset Lat Peak Lat Amp (O-P) Amp (P-P) Segment Distance Velocity Temperature Comment   Site ms ms  V Norm  V  cm m/s Norm  C    Left Medial Antebrachial Cutaneous Sensory   Elbow-Med Forearm 1.88 2.5 8 - 8 Elbow-Med Forearm 12.5 66 - 33.6    Right Medial Antebrachial Cutaneous Sensory   Elbow-Med Forearm 1.75 2.3 9 - 14 Elbow-Med Forearm 12.5 71 - 32.2    Left Median Sensory   Wrist-Dig II 2.5 3.2 26  > 10 45 Wrist-Dig II 14 56  > 48 33.1    Left Radial Sensory   Forearm-Wrist 1.43 1.83 15  > 15 53 Forearm-Wrist 10 70 - 32.6    Left Ulnar Sensory   Wrist-Dig V 2.0 2.7 21  > 8 20 Wrist-Dig V 12.5 63  > 48 33.1        Electromyography     Side Muscle Ins Act Fibs/PSW Fasc HF Amp Dur Poly Recrt Int Pat   Left FDI Nml None Nml 0 Nml Nml 0 Nml Nml   Left Triceps Nml None Nml 0 Nml Nml " 0 Nml Nml   Left Pronator Teres Nml None Nml 0 Nml Nml 0 Nml Nml   Left Biceps Nml None Nml 0 1+ 1+ 0 Ashley Nml   Left Deltoid Nml None Nml 0 Nml Nml 0 Nml Nml   Left Brachiorad Nml None Nml 0 1+ 1+ 0 Nml Nml   Left APB Nml None Nml 0 Nml Nml 0 Nml Nml         NCS Waveforms:    Motor         Sensory                    Ultrasound Images:

## 2023-03-16 ENCOUNTER — TELEPHONE (OUTPATIENT)
Dept: PHYSICAL MEDICINE AND REHAB | Facility: CLINIC | Age: 63
End: 2023-03-16
Payer: COMMERCIAL

## 2023-03-20 NOTE — TELEPHONE ENCOUNTER
Returned a call to patient, clarified that her appt is at 11:30 with a check in at 11:15, gave our address and located on the Third floor. Clarified there is no Prep for this visit and it's scheduled for 1/2 hour give and take (considering it's a doctor's appointment she may finish at 11:30 and will need  to wait to see if any reaction from injection)  Pt agreed and verbalized understating of this plan.

## 2023-03-20 NOTE — TELEPHONE ENCOUNTER
[facility]    Phone Message    May a detailed message be left on voicemail: yes     Reason for Call: Other: Pt treurned messed call about appointment on 3/30/23.     First pt claimed that message that was left stated that she was scheduled with both Dr. Haro and Dr. Spears. Writer corrected pt to advise this appt is only with Dr. Haro. Pt was annoyed and insisted that the message said it was with both poroviders. Pt became disagreeable.    Pt then asked if this procedure was approved by her insurance. Writer advise would have clinical team call to advise.     Then pt started asking question about any prep that is needed and what kind of clothing she should wear to this appt. When writer explained that these questions needed to be addressed to the clinical staff, pt became more disagreeable and kept asking why she was directed to scheduling if writer could not pctrl2a questions. Pt was not listening to answers and continued to ask the same questions.     Finally after dismissing the entire conversation, she Questioned why writer was advising her that writer was going to have clinical team call her back.    Please call pt back at [phone] and clarify that appt is only with Dr. Haro, whether or not procedure is approved and what clothes she should where.    Action Taken: Message routed to:  [facility] ([facility] PMR    Travel Screening: Not Applicable

## 2023-03-30 ENCOUNTER — OFFICE VISIT (OUTPATIENT)
Dept: PHYSICAL MEDICINE AND REHAB | Facility: CLINIC | Age: 63
End: 2023-03-30
Payer: COMMERCIAL

## 2023-03-30 VITALS
DIASTOLIC BLOOD PRESSURE: 99 MMHG | SYSTOLIC BLOOD PRESSURE: 140 MMHG | RESPIRATION RATE: 16 BRPM | TEMPERATURE: 98.2 F | OXYGEN SATURATION: 99 % | HEART RATE: 80 BPM

## 2023-03-30 DIAGNOSIS — H93.13 TINNITUS, BILATERAL: ICD-10-CM

## 2023-03-30 DIAGNOSIS — G54.0 TOS (THORACIC OUTLET SYNDROME): ICD-10-CM

## 2023-03-30 DIAGNOSIS — G24.3 CERVICAL DYSTONIA: Primary | ICD-10-CM

## 2023-03-30 DIAGNOSIS — I87.1 COMPRESSION OF VEIN: ICD-10-CM

## 2023-03-30 PROCEDURE — 76942 ECHO GUIDE FOR BIOPSY: CPT | Mod: 26 | Performed by: PHYSICAL MEDICINE & REHABILITATION

## 2023-03-30 PROCEDURE — 20553 NJX 1/MLT TRIGGER POINTS 3/>: CPT | Performed by: PHYSICAL MEDICINE & REHABILITATION

## 2023-03-30 RX ORDER — LIDOCAINE HYDROCHLORIDE 20 MG/ML
6 INJECTION, SOLUTION INFILTRATION; PERINEURAL ONCE
Status: COMPLETED | OUTPATIENT
Start: 2023-03-30 | End: 2023-03-30

## 2023-03-30 RX ADMIN — LIDOCAINE HYDROCHLORIDE 6 ML: 20 INJECTION, SOLUTION INFILTRATION; PERINEURAL at 17:09

## 2023-03-30 ASSESSMENT — PAIN SCALES - GENERAL: PAINLEVEL: MODERATE PAIN (5)

## 2023-03-30 NOTE — PROGRESS NOTES
PROCEDURE NOTE: Intramuscular Injection Under Ultrasound Guidance    PROCEDURE DATE: 3/30/2023    PATIENT NAME: Christa Huang  YOB: 1960    ATTENDING PHYSICIAN: Demetrius Haro MD  Co-Physician: Avtar Spears MD    PREOPERATIVE DIAGNOSIS:   1. Cervical dystonia    2. Compression of vein    3. TOS (thoracic outlet syndrome)    4. Tinnitus, bilateral       POSTOPERATIVE DIAGNOSIS: same    PROCEDURE PERFORMED: Bilateral Anterior Scalene Muscle and Sternocleidomastoid Muscle (SCM) Block Under Ultrasound Guidance    ULTRASOUND WAS USED.     INDICATIONS FOR THE PROCEDURE:  Christa Huang is a 62 year old female who presents with thoracic outlet syndrome, muscle spasm, dystonia, tinnitus.     PROCEDURE AND FINDINGS:  She was greeted in the clinic. The risk, benefits and alternatives to the procedure were again reviewed with her and informed consent was obtained and the patient agreed to proceed. A time-out was performed. Following review alternatives, benefits and risks, the procedure was carried out under sterile prep with sterile gel. The use of direct sonographic guidance was used to ensure accurate placement of the needle (rather than non-guided injection) and required to minimize the risk of bleeding or injury to nearby neurovascular structures. Images were recorded and stored.     A 15-6MHz ultrasound transducer was used to visualize the relevant structures and determine the optimal needle path for the procedure. A 27 gauge 1.5 inch needle was advanced utilizing an out-of-plane approach, under continuous ultrasound guidance to the anterior scalene muscle and sternocleidomastoid muscle on both sides. After negative aspiration, slow injection of the treatment solution 1.5mL total consisting of 2% Lidocaine was instilled into affected area. The tip of the needle was visualized throughout the procedure. The remainder of the single-use vials were discarded.      Her pre--/post- symptoms procedure scores  are as follows (0-10):  Tenderness: 10->8  Left upper extremity: 10->9  Right upper extremity: 8->7  Head pressure: 10->9    She tolerated the procedure well, was discharged home in stable condition.     Follow-up will be determined after review of symptom diary/block sheet by Dr. Spears in Neurology clinic     COMPLICATIONS: None    COMMENTS: None

## 2023-03-30 NOTE — LETTER
3/30/2023       RE: Christa Huang  [address] [facility] [address]     Dear Colleague,    Thank you for referring your patient, Christa Huang, to the [facility] PHYSICAL MEDICINE AND REHABILITATION CLINIC [place] at [facility]. Please see a copy of my visit note below.    PROCEDURE NOTE: Intramuscular Injection Under Ultrasound Guidance    PROCEDURE DATE: 3/30/2023    PATIENT NAME: Christa Huang  YOB: 1960    ATTENDING PHYSICIAN: Demetrius Haro MD  Co-Physician: Avtar Spears MD    PREOPERATIVE DIAGNOSIS:   1. Cervical dystonia    2. Compression of vein    3. TOS (thoracic outlet syndrome)    4. Tinnitus, bilateral       POSTOPERATIVE DIAGNOSIS: same    PROCEDURE PERFORMED: Bilateral Anterior Scalene Muscle and Sternocleidomastoid Muscle (SCM) Block Under Ultrasound Guidance    ULTRASOUND WAS USED.     INDICATIONS FOR THE PROCEDURE:  Christa Huang is a 62 year old female who presents with thoracic outlet syndrome, muscle spasm, dystonia, tinnitus.     PROCEDURE AND FINDINGS:  She was greeted in the clinic. The risk, benefits and alternatives to the procedure were again reviewed with her and informed consent was obtained and the patient agreed to proceed. A time-out was performed. Following review alternatives, benefits and risks, the procedure was carried out under sterile prep with sterile gel. The use of direct sonographic guidance was used to ensure accurate placement of the needle (rather than non-guided injection) and required to minimize the risk of bleeding or injury to nearby neurovascular structures. Images were recorded and stored.     A 15-6MHz ultrasound transducer was used to visualize the relevant structures and determine the optimal needle path for the procedure. A 27 gauge 1.5 inch needle was advanced utilizing an out-of-plane approach, under continuous ultrasound guidance to the anterior scalene muscle and  sternocleidomastoid muscle on both sides. After negative aspiration, slow injection of the treatment solution 1.5mL total consisting of 2% Lidocaine was instilled into affected area. The tip of the needle was visualized throughout the procedure. The remainder of the single-use vials were discarded.      Her pre--/post- symptoms procedure scores are as follows (0-10):  Tenderness: 10->8  Left upper extremity: 10->9  Right upper extremity: 8->7  Head pressure: 10->9    She tolerated the procedure well, was discharged home in stable condition.     Follow-up will be determined after review of symptom diary/block sheet by Dr. Spears in Neurology clinic     COMPLICATIONS: None    COMMENTS: None            Again, thank you for allowing me to participate in the care of your patient.      Sincerely,    Demetrius Haro MD

## 2023-05-17 ENCOUNTER — TELEPHONE (OUTPATIENT)
Dept: NEUROLOGY | Facility: CLINIC | Age: 63
End: 2023-05-17
Payer: COMMERCIAL

## 2023-05-17 NOTE — TELEPHONE ENCOUNTER
I called and scheduled the appointment with the pt, I explained that the appointment will be sent to her email and that once she entered the appt email, it will prompt her from there the steps she will need to take to attend the appointment. I also mentioned that she may be receiving a call from a nurse a day or two prior, (that is the information I've been told since I started)    5/17/23 BD

## 2023-05-26 ENCOUNTER — VIRTUAL VISIT (OUTPATIENT)
Dept: NEUROLOGY | Facility: CLINIC | Age: 63
End: 2023-05-26
Payer: COMMERCIAL

## 2023-05-26 DIAGNOSIS — G93.2 INTRACRANIAL PRESSURE INCREASED: Primary | ICD-10-CM

## 2023-05-26 DIAGNOSIS — G54.0 TOS (THORACIC OUTLET SYNDROME): ICD-10-CM

## 2023-05-26 DIAGNOSIS — I87.1 COMPRESSION OF VEIN: ICD-10-CM

## 2023-05-26 DIAGNOSIS — M43.6 CONTRACTURE OF STERNOCLEIDOMASTOID MUSCLE: ICD-10-CM

## 2023-05-26 PROCEDURE — 99215 OFFICE O/P EST HI 40 MIN: CPT | Mod: VID | Performed by: PSYCHIATRY & NEUROLOGY

## 2023-05-26 RX ORDER — FLUOCINOLONE ACETONIDE 0.11 MG/ML
1 OIL TOPICAL SEE ADMIN INSTRUCTIONS
Status: ON HOLD | COMMUNITY
Start: 2023-05-02 | End: 2023-06-05

## 2023-05-26 RX ORDER — LEVOTHYROXINE SODIUM 75 UG/1
1 TABLET ORAL SEE ADMIN INSTRUCTIONS
COMMUNITY
Start: 2023-05-18

## 2023-05-26 RX ORDER — KETOCONAZOLE 20 MG/ML
1 SHAMPOO TOPICAL SEE ADMIN INSTRUCTIONS
Status: ON HOLD | COMMUNITY
Start: 2023-05-02 | End: 2023-06-05

## 2023-05-26 NOTE — PROGRESS NOTES
The patient is being evaluated via a billable video visit.    How would you like to obtain your AVS? MyChart  If the video visit is dropped, the invitation should be resent by: Send to e-mail at: [email or web address]  Will anyone else be joining your video visit? No      Video-Visit Details  Type of service:  Video Visit  Video Start Time:1:40 PM  Video End Time:2:16 PM  Originating Location (pt. Location): Home  Distant Location (provider location):  [facility] NEUROLOGY [facility]   Platform used for Video Visit: [facility]    Follow-up 1-4-23  Christa Huang is a 62 year old female with a past medical history of cervical spondylosis and myelopathy with prior C4-C6 fusion in 2012 with subsequent removal of that hardware and subsequent ACDF from C6-C7 on 10-4-22.  Prior history of gastric sleeve surgery (2012).  She has had intractable neck and bilateral upper extremity pain and paresthesias that are worsened by being reclined.  She has had to sleep upright because reclining exacerbated the symptoms.  Symptoms are worse on the left side and are triggered by head turning to either direction as well as arm abduction. She has tenderness over the left sternocleidomastoid muscle and the left pectoralis minor tendon. 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  She may have both neurogenic and venous TOS as well as pectoralis minor syndrome.  Because of her severe cervical spondylosis and symptoms worse with head turning, concurrent radicular compression is also a possibility.  We discussed a strategy for evaluating each of these possibilities.      Recommendations:  1. US TOS/IJ  2. Trial ASM/SCM block for cervical dystonia  3. Pectoralis minor muscle block for pectoralis minor syndrome  3. Physical therapy for TOS  4. NCS/EMG     Interim History 5/26/2023:   She has not been able to lie flat for 2-3 years. She has been sleeping sitting up. She also notes that even in the early 1990's that  when she would exert her head that her face would turn very red.     1-27-23: CTV venogram: With the head turned to the left there is increased mass effect on the left internal jugular vein at the level of C3-4 and between the adjacent musculature. The vein completely collapses.    With the head turned to the right there is increased extradural mass effect on the right internal jugular vein with complete collapse at the level of C4-5 and between the adjacent musculature.     1-13-23: US TOS/IJ: Right: Subclavian vein occludes in 90 degrees, Internal jugular vein narrowing suggested in extension. No occlusion demonstrated.     3-30-23: PROCEDURE PERFORMED: Bilateral Anterior Scalene Muscle and Sternocleidomastoid Muscle (SCM) Block Under Ultrasound Guidance  Her pre--/post- symptoms procedure scores are as follows (0-10):  Tenderness: 10->8  Left upper extremity: 10->9  Right upper extremity: 8->7  Head pressure: 10->9  Improvements lasted until she drove home. She was not able to lie down.    She has not done physical therapy for TOS or SCM syndrome because she doesn't tolerate lying down.  She also reports that a physical therapist wouldn't treat her because of her not being able to lie down.   She doesn't want to try Botox.     Plan:  1. Lumbar puncture for measuring CSF pressure   2. Referral to Dr. Charly Ken for TOS and SCM syndrome.   3. Trial of acetazolamide if pressure is high    DATA:  I personally reviewed the following data.    THORACIC INLET/OUTLET DUPLEX ULTRASOUND 1/13/2023  CLINICAL HISTORY: Compression of vein. Thoracic outlet syndrome. 62F  with ACDF, neck and arm pain.     COMPARISONS: None available.   FINDINGS:  RIGHT:       REST:            INTERNAL JUGULAR VEIN: 52 cm/s, phasic, fully compressible            INNOMINATE VEIN: 50 cm/s, phasic            SUBCLAVIAN VEIN, medial: 108 cm/s, phasic            SUBCLAVIAN VEIN, mid: 43 cm/s, phasic, fully compressible            SUBCLAVIAN VEIN,  lateral: 115 cm/s, phasic, fully  compressible            AXILLARY VEIN: 81 cm/s, phasic, fully compressible          MID SUBCLAVIAN VEIN, sitting erect:            0 degrees: 112 cm/s, phasic            90 degrees: 0 cm/s, OCCLUDED            135 degrees: 107 cm/s, phasic            180 degrees: 86 cm/s, phasic          INTERNAL JUGULAR VEIN, sitting erect:            Neutral: 166 cm/s, phasic            Right: 140 cm/s, phasic            Left: 75 cm/s, phasic            Extension: 49 cm/s, flattened but still phasic            Flexion: 55 cm/s, phasic          PPGs:            Baseline: Normal            Arms 90: ABNORMAL - OCCLUDED            Arms 180: Normal              : ABNORMAL - diminished             head right: ABNORMAL - diminished             head left: ABNORMAL - diminished     LEFT:       REST:            INTERNAL JUGULAR VEIN: 115 cm/s, phasic, fully compressible            INNOMINATE VEIN: 130 cm/s, phasic            SUBCLAVIAN VEIN, medial: 58 cm/s, phasic            SUBCLAVIAN VEIN, mid: 73 cm/s, phasic, fully compressible            SUBCLAVIAN VEIN, lateral: 68 cm/s, phasic, fully  compressible            AXILLARY VEIN: 59 cm/s, phasic, fully compressible          MID SUBCLAVIAN VEIN, sitting erect:            0 degrees: 122 cm/s, phasic            90 degrees: 29 cm/s, flattened            135 degrees: 175 cm/s, phasic            180 degrees: 198 cm/s, phasic          INTERNAL JUGULAR VEIN, sitting erect:            Neutral: 128 cm/s, phasic            Right: 45 cm/s, phasic            Left: 104 cm/s, phasic            Extension: 161 cm/s, phasic            Flexion: 122 cm/s, phasic         PPGs:            Baseline: Normal            Arms 90: Normal            Arms 180: Normal            : ABNORMAL - diminished             head right: ABNORMAL - diminished             head left: ABNORMAL - diminished                                                              IMPRESSION: Thoracic outlet/inlet physiology suggested. Correlate for  symptoms.  1. RIGHT:       A. No subclavian venous stenosis suggested at rest.       B. Subclavian vein occludes in 90 degrees.        C. Internal jugular vein narrowing suggested in extension. No  occlusion demonstrated.       D. PPG occludes in Arms 90. PPGs diminished in   positions.  2. LEFT:       A. No subclavian venous stenosis suggested at rest.       B. Subclavian venous narrowing suggested in 90 degrees. No  occlusion demonstrated.       C. No internal jugular venous stenosis suggested with maneuvers.       D. PPGs diminished in  positions. No occlusion  demonstrated.     VIDA SAENZ MD     47-minutes were spent in evaluation, counseling, and documentation on the date of service.

## 2023-05-26 NOTE — LETTER
5/26/2023       RE: Christa Huang  [address] [facility] [address]       Dear Colleague,    Thank you for referring your patient, Christa Huang, to the [facility] [place] [facility]. Please see a copy of my visit note below.    The patient is being evaluated via a billable video visit.    How would you like to obtain your AVS? MyChart  If the video visit is dropped, the invitation should be resent by: Send to e-mail at: [email or web address]  Will anyone else be joining your video visit? No      Follow-up 1-4-23  Christa Huang is a 62 year old female with a past medical history of cervical spondylosis and myelopathy with prior C4-C6 fusion in 2012 with subsequent removal of that hardware and subsequent ACDF from C6-C7 on 10-4-22.  Prior history of gastric sleeve surgery (2012).  She has had intractable neck and bilateral upper extremity pain and paresthesias that are worsened by being reclined.  She has had to sleep upright because reclining exacerbated the symptoms.  Symptoms are worse on the left side and are triggered by head turning to either direction as well as arm abduction. She has tenderness over the left sternocleidomastoid muscle and the left pectoralis minor tendon. 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  She may have both neurogenic and venous TOS as well as pectoralis minor syndrome.  Because of her severe cervical spondylosis and symptoms worse with head turning, concurrent radicular compression is also a possibility.  We discussed a strategy for evaluating each of these possibilities.      Recommendations:  1. US TOS/IJ  2. Trial ASM/SCM block for cervical dystonia  3. Pectoralis minor muscle block for pectoralis minor syndrome  3. Physical therapy for TOS  4. NCS/EMG     Interim History 5/26/2023:   She has not been able to lie flat for 2-3 years. She has been sleeping sitting up. She  also notes that even in the early 1990's that when she would exert her head that her face would turn very red.     1-27-23: CTV venogram: With the head turned to the left there is increased mass effect on the left internal jugular vein at the level of C3-4 and between the adjacent musculature. The vein completely collapses.    With the head turned to the right there is increased extradural mass effect on the right internal jugular vein with complete collapse at the level of C4-5 and between the adjacent musculature.     1-13-23: US TOS/IJ: Right: Subclavian vein occludes in 90 degrees, Internal jugular vein narrowing suggested in extension. No occlusion demonstrated.     3-30-23: PROCEDURE PERFORMED: Bilateral Anterior Scalene Muscle and Sternocleidomastoid Muscle (SCM) Block Under Ultrasound Guidance  Her pre--/post- symptoms procedure scores are as follows (0-10):  Tenderness: 10->8  Left upper extremity: 10->9  Right upper extremity: 8->7  Head pressure: 10->9  Improvements lasted until she drove home. She was not able to lie down.    She has not done physical therapy for TOS or SCM syndrome because she doesn't tolerate lying down.  She also reports that a physical therapist wouldn't treat her because of her not being able to lie down.   She doesn't want to try Botox.     Plan:  1. Lumbar puncture for measuring CSF pressure   2. Referral to Dr. Charly Ken for TOS and SCM syndrome.   3. Trial of acetazolamide if pressure is high    DATA:  I personally reviewed the following data.    THORACIC INLET/OUTLET DUPLEX ULTRASOUND 1/13/2023  CLINICAL HISTORY: Compression of vein. Thoracic outlet syndrome. 62F  with ACDF, neck and arm pain.     COMPARISONS: None available.   FINDINGS:  RIGHT:       REST:            INTERNAL JUGULAR VEIN: 52 cm/s, phasic, fully compressible            INNOMINATE VEIN: 50 cm/s, phasic            SUBCLAVIAN VEIN, medial: 108 cm/s, phasic            SUBCLAVIAN VEIN, mid: 43 cm/s, phasic,  fully compressible            SUBCLAVIAN VEIN, lateral: 115 cm/s, phasic, fully  compressible            AXILLARY VEIN: 81 cm/s, phasic, fully compressible          MID SUBCLAVIAN VEIN, sitting erect:            0 degrees: 112 cm/s, phasic            90 degrees: 0 cm/s, OCCLUDED            135 degrees: 107 cm/s, phasic            180 degrees: 86 cm/s, phasic          INTERNAL JUGULAR VEIN, sitting erect:            Neutral: 166 cm/s, phasic            Right: 140 cm/s, phasic            Left: 75 cm/s, phasic            Extension: 49 cm/s, flattened but still phasic            Flexion: 55 cm/s, phasic          PPGs:            Baseline: Normal            Arms 90: ABNORMAL - OCCLUDED            Arms 180: Normal              : ABNORMAL - diminished             head right: ABNORMAL - diminished             head left: ABNORMAL - diminished     LEFT:       REST:            INTERNAL JUGULAR VEIN: 115 cm/s, phasic, fully compressible            INNOMINATE VEIN: 130 cm/s, phasic            SUBCLAVIAN VEIN, medial: 58 cm/s, phasic            SUBCLAVIAN VEIN, mid: 73 cm/s, phasic, fully compressible            SUBCLAVIAN VEIN, lateral: 68 cm/s, phasic, fully  compressible            AXILLARY VEIN: 59 cm/s, phasic, fully compressible          MID SUBCLAVIAN VEIN, sitting erect:            0 degrees: 122 cm/s, phasic            90 degrees: 29 cm/s, flattened            135 degrees: 175 cm/s, phasic            180 degrees: 198 cm/s, phasic          INTERNAL JUGULAR VEIN, sitting erect:            Neutral: 128 cm/s, phasic            Right: 45 cm/s, phasic            Left: 104 cm/s, phasic            Extension: 161 cm/s, phasic            Flexion: 122 cm/s, phasic         PPGs:            Baseline: Normal            Arms 90: Normal            Arms 180: Normal            : ABNORMAL - diminished             head right: ABNORMAL - diminished             head left: ABNORMAL - diminished                                                              IMPRESSION: Thoracic outlet/inlet physiology suggested. Correlate for  symptoms.  1. RIGHT:       A. No subclavian venous stenosis suggested at rest.       B. Subclavian vein occludes in 90 degrees.        C. Internal jugular vein narrowing suggested in extension. No  occlusion demonstrated.       D. PPG occludes in Arms 90. PPGs diminished in   positions.  2. LEFT:       A. No subclavian venous stenosis suggested at rest.       B. Subclavian venous narrowing suggested in 90 degrees. No  occlusion demonstrated.       C. No internal jugular venous stenosis suggested with maneuvers.       D. PPGs diminished in  positions. No occlusion  demonstrated.     VIDA SAENZ MD     47-minutes were spent in evaluation, counseling, and documentation on the date of service.        Again, thank you for allowing me to participate in the care of your patient.      Sincerely,    Avtar DOVER Cha, MD

## 2023-05-30 ENCOUNTER — TELEPHONE (OUTPATIENT)
Dept: NEUROLOGY | Facility: CLINIC | Age: 63
End: 2023-05-30
Payer: COMMERCIAL

## 2023-05-30 NOTE — TELEPHONE ENCOUNTER
Avtar Spears MD O'Neill, Mary K, RN  Phone Number: [phone]     The patient at 3:30pm on 6-9 was just seen today.   Could you cancel the 3:30pm patient?

## 2023-05-31 ENCOUNTER — ANCILLARY PROCEDURE (OUTPATIENT)
Dept: INTERVENTIONAL RADIOLOGY/VASCULAR | Facility: CLINIC | Age: 63
End: 2023-05-31
Attending: PSYCHIATRY & NEUROLOGY
Payer: COMMERCIAL

## 2023-05-31 ENCOUNTER — LAB (OUTPATIENT)
Dept: LAB | Facility: CLINIC | Age: 63
End: 2023-05-31
Payer: COMMERCIAL

## 2023-05-31 VITALS
SYSTOLIC BLOOD PRESSURE: 122 MMHG | DIASTOLIC BLOOD PRESSURE: 80 MMHG | TEMPERATURE: 99 F | HEART RATE: 72 BPM | OXYGEN SATURATION: 96 %

## 2023-05-31 DIAGNOSIS — G93.2 INTRACRANIAL PRESSURE INCREASED: ICD-10-CM

## 2023-05-31 DIAGNOSIS — G93.2 INTRACRANIAL PRESSURE INCREASED: Primary | ICD-10-CM

## 2023-05-31 LAB
APPEARANCE CSF: CLEAR
COLOR CSF: COLORLESS
GLUCOSE CSF-MCNC: 62 MG/DL (ref 40–70)
HOLD SPECIMEN: NORMAL
INR PPP: 1 (ref 0.85–1.15)
PLATELET # BLD AUTO: 291 10E3/UL (ref 150–450)
PROT CSF-MCNC: 32.2 MG/DL (ref 15–45)
RBC # CSF MANUAL: 5 /UL (ref 0–2)
TUBE # CSF: 3
WBC # CSF MANUAL: 0 /UL (ref 0–5)

## 2023-05-31 PROCEDURE — 36415 COLL VENOUS BLD VENIPUNCTURE: CPT | Performed by: PATHOLOGY

## 2023-05-31 PROCEDURE — 87015 SPECIMEN INFECT AGNT CONCNTJ: CPT | Mod: 90 | Performed by: PATHOLOGY

## 2023-05-31 PROCEDURE — 85610 PROTHROMBIN TIME: CPT | Performed by: PATHOLOGY

## 2023-05-31 PROCEDURE — 99000 SPECIMEN HANDLING OFFICE-LAB: CPT | Performed by: PATHOLOGY

## 2023-05-31 PROCEDURE — 85049 AUTOMATED PLATELET COUNT: CPT | Performed by: PATHOLOGY

## 2023-05-31 PROCEDURE — 89050 BODY FLUID CELL COUNT: CPT | Mod: 90 | Performed by: PATHOLOGY

## 2023-05-31 PROCEDURE — 87205 SMEAR GRAM STAIN: CPT | Mod: 90 | Performed by: PATHOLOGY

## 2023-05-31 PROCEDURE — 84157 ASSAY OF PROTEIN OTHER: CPT | Mod: 90 | Performed by: PATHOLOGY

## 2023-05-31 PROCEDURE — 62328 DX LMBR SPI PNXR W/FLUOR/CT: CPT | Performed by: PHYSICIAN ASSISTANT

## 2023-05-31 PROCEDURE — 82945 GLUCOSE OTHER FLUID: CPT | Mod: 90 | Performed by: PATHOLOGY

## 2023-05-31 PROCEDURE — 87070 CULTURE OTHR SPECIMN AEROBIC: CPT | Mod: 90 | Performed by: PATHOLOGY

## 2023-05-31 RX ORDER — LIDOCAINE HYDROCHLORIDE 10 MG/ML
5 INJECTION, SOLUTION EPIDURAL; INFILTRATION; INTRACAUDAL; PERINEURAL ONCE
Status: COMPLETED | OUTPATIENT
Start: 2023-05-31 | End: 2023-05-31

## 2023-05-31 RX ADMIN — LIDOCAINE HYDROCHLORIDE 5 ML: 10 INJECTION, SOLUTION EPIDURAL; INFILTRATION; INTRACAUDAL; PERINEURAL at 13:37

## 2023-05-31 NOTE — DISCHARGE INSTRUCTIONS
Discharge instructions for Lumbar Puncture           AFTER YOU GO HOME      Relax and take it easy for 24 hours  You may resume normal activity after the 24 hour period  You may develop a headache that will normally go away on its own in 1 to 2 days. Lying down should help relieve this pain.  If you develop a headache you can take over the counter medicines and lay down.  You can try drinking caffeine-coffee, soda.   Drink at least 4 glasses of extra fluid today if not on a fluid restriction  Continue to take your medications as ordered by your provider  You may remove the bandage and resume bathing the next day  DO NOT drive or operate machinery at home or at work for at least 24 hours               CALL YOU PRIMARY PHYSICIAN IF:    Severe head or neck pain that doesn't go away or that gets worse  You feel less alert or have trouble waking up  Repeated upset stomach (nausea) or vomiting  Swelling, pain, bruising, or redness at the puncture site  Fever of 100.4 F (38 C) or higher, or as advised by your provider  If you start to leak a large amount of fluid from the puncture site (also, lie down flat on your back)      Date: 5/31  Provider: TAZ Bradshaw, Interventional Radiology, [facility]    [facility]  [address] [place] [address]

## 2023-05-31 NOTE — PROGRESS NOTES
Interventional Radiology Brief Post Procedure Note    Procedure: IR Lumbar Puncture    Proceduralist: TAZ Jackson PA-C    Assistant: None    Time Out: Prior to the start of the procedure and with procedural staff participation, I verbally confirmed the patient s identity using two indicators, relevant allergies, that the procedure was appropriate and matched the consent or emergent situation, and that the correct equipment/implants were available. Immediately prior to starting the procedure I conducted the Time Out with the procedural staff and re-confirmed the patient s name, procedure, and site/side. (The Joint Commission universal protocol was followed.)  Yes    Medications   Medication Event Details Admin User Admin Time       Sedation: None. Local Anesthestic used    Findings: Completed image guided L3/L4 lumbar puncture with immediate return of clear CSF.  Opening pressure was measured at 19 cm of H2O.  A total of 12 mL of clear CSF was collected for diagnostic test.  Patient tolerated procedure well.  No immediate complication.    Estimated Blood Loss: Minimal    Fluoroscopy Time:  1.4 minute(s)    SPECIMENS: Fluid and/or tissue for laboratory analysis    Complications: 1. None     Condition: Stable    Plan: 1 hour bedrest. Follow-up per primary team.     Comments: See dictated procedure note for full details.    Arden Patiño PA-C

## 2023-06-01 ENCOUNTER — CARE COORDINATION (OUTPATIENT)
Dept: NEUROLOGY | Facility: CLINIC | Age: 63
End: 2023-06-01
Payer: COMMERCIAL

## 2023-06-01 ENCOUNTER — TELEPHONE (OUTPATIENT)
Dept: INTERVENTIONAL RADIOLOGY/VASCULAR | Facility: CLINIC | Age: 63
End: 2023-06-01
Payer: COMMERCIAL

## 2023-06-01 DIAGNOSIS — G93.2 INCREASED INTRACRANIAL PRESSURE: Primary | ICD-10-CM

## 2023-06-01 RX ORDER — ACETAZOLAMIDE 250 MG/1
TABLET ORAL
Qty: 60 TABLET | Refills: 3 | Status: SHIPPED | OUTPATIENT
Start: 2023-06-01

## 2023-06-01 NOTE — TELEPHONE ENCOUNTER
Allison called IR today to report a severe headache with sitting that has been relieved somewhat by laying down.    She was educated to signs of infection and told to be on strict flat bedrest for 48 hours. She is also trying some over the counter medications and drinking caffeine per RN instructions.    She will call back to IR if her condition worsens and will check in next week if a blood patch becomes necessary.

## 2023-06-02 NOTE — TELEPHONE ENCOUNTER
Patient calling again this morning. Reports her headache is severe and when she does need to sit up she feels nauseous, dizzy, and diaphoretic. States she's been compliant with flat bedrest and fluids, but cannot tolerate symptoms.    Notified IR PA, they have contacted patient to further discuss.    Serenity Prasad RN  Interventional Radiology  [phone]

## 2023-06-05 ENCOUNTER — HOSPITAL ENCOUNTER (OUTPATIENT)
Facility: CLINIC | Age: 63
Discharge: HOME | End: 2023-06-05
Attending: PSYCHIATRY & NEUROLOGY | Admitting: PSYCHIATRY & NEUROLOGY
Payer: COMMERCIAL

## 2023-06-05 ENCOUNTER — APPOINTMENT (OUTPATIENT)
Dept: MEDSURG UNIT | Facility: CLINIC | Age: 63
End: 2023-06-05
Attending: PSYCHIATRY & NEUROLOGY
Payer: COMMERCIAL

## 2023-06-05 ENCOUNTER — APPOINTMENT (OUTPATIENT)
Dept: GENERAL RADIOLOGY | Facility: CLINIC | Age: 63
End: 2023-06-05
Attending: PSYCHIATRY & NEUROLOGY
Payer: COMMERCIAL

## 2023-06-05 ENCOUNTER — TELEPHONE (OUTPATIENT)
Dept: INTERVENTIONAL RADIOLOGY/VASCULAR | Facility: CLINIC | Age: 63
End: 2023-06-05
Payer: COMMERCIAL

## 2023-06-05 VITALS
DIASTOLIC BLOOD PRESSURE: 66 MMHG | RESPIRATION RATE: 16 BRPM | WEIGHT: 175.93 LBS | TEMPERATURE: 98.8 F | HEART RATE: 75 BPM | OXYGEN SATURATION: 95 % | HEIGHT: 63 IN | BODY MASS INDEX: 31.17 KG/M2 | SYSTOLIC BLOOD PRESSURE: 122 MMHG

## 2023-06-05 DIAGNOSIS — G93.2 INTRACRANIAL PRESSURE INCREASED: ICD-10-CM

## 2023-06-05 DIAGNOSIS — G93.2 INTRACRANIAL PRESSURE INCREASED: Primary | ICD-10-CM

## 2023-06-05 LAB
BACTERIA CSF CULT: NO GROWTH
GRAM STAIN RESULT: NORMAL
GRAM STAIN RESULT: NORMAL

## 2023-06-05 PROCEDURE — 62273 INJECT EPIDURAL PATCH: CPT | Mod: GC | Performed by: RADIOLOGY

## 2023-06-05 PROCEDURE — 77003 FLUOROGUIDE FOR SPINE INJECT: CPT | Mod: 26 | Performed by: RADIOLOGY

## 2023-06-05 PROCEDURE — 999N000132 HC STATISTIC PP CARE STAGE 1

## 2023-06-05 PROCEDURE — 250N000009 HC RX 250: Performed by: RADIOLOGY

## 2023-06-05 PROCEDURE — 255N000002 HC RX 255 OP 636: Performed by: RADIOLOGY

## 2023-06-05 PROCEDURE — 62273 INJECT EPIDURAL PATCH: CPT

## 2023-06-05 PROCEDURE — 999N000142 HC STATISTIC PROCEDURE PREP ONLY

## 2023-06-05 PROCEDURE — 258N000003 HC RX IP 258 OP 636: Performed by: NURSE PRACTITIONER

## 2023-06-05 RX ORDER — LIDOCAINE HYDROCHLORIDE 10 MG/ML
30 INJECTION, SOLUTION EPIDURAL; INFILTRATION; INTRACAUDAL; PERINEURAL ONCE
Status: COMPLETED | OUTPATIENT
Start: 2023-06-05 | End: 2023-06-05

## 2023-06-05 RX ORDER — IOPAMIDOL 408 MG/ML
20 INJECTION, SOLUTION INTRATHECAL ONCE
Status: COMPLETED | OUTPATIENT
Start: 2023-06-05 | End: 2023-06-05

## 2023-06-05 RX ORDER — LIDOCAINE 40 MG/G
CREAM TOPICAL
Status: DISCONTINUED | OUTPATIENT
Start: 2023-06-05 | End: 2023-06-05 | Stop reason: HOSPADM

## 2023-06-05 RX ORDER — SODIUM CHLORIDE 9 MG/ML
75 INJECTION, SOLUTION INTRAVENOUS CONTINUOUS
Status: DISCONTINUED | OUTPATIENT
Start: 2023-06-05 | End: 2023-06-05 | Stop reason: HOSPADM

## 2023-06-05 RX ADMIN — LIDOCAINE HYDROCHLORIDE 5 ML: 10 INJECTION, SOLUTION EPIDURAL; INFILTRATION; INTRACAUDAL; PERINEURAL at 15:28

## 2023-06-05 RX ADMIN — IOPAMIDOL 3 ML: 408 INJECTION, SOLUTION INTRATHECAL at 15:28

## 2023-06-05 RX ADMIN — SODIUM CHLORIDE 75 ML/HR: 9 INJECTION, SOLUTION INTRAVENOUS at 13:27

## 2023-06-05 ASSESSMENT — ACTIVITIES OF DAILY LIVING (ADL)
ADLS_ACUITY_SCORE: 35

## 2023-06-05 NOTE — PROGRESS NOTES
Pt completed bedrest; Pt up walking, steady; Site remains dry and intact; IV discontinued, catheter intact. Tolerated PO, food and drink. Voided. Discharge teaching done, stated understanding, copy to pt. 1715--escorted to vehicle/family per wheelchair; pt reports has all belongings.

## 2023-06-05 NOTE — PROGRESS NOTES
Pt prepped for Blood Patch.  VSS.  Pt reports 8/10 throbbing headache; stimuli minimized.  Left 18 gauge PIV placed and currently infusing NS at 75 ml/hr.  No labs ordered this morning, last set drawn on 5/31/23.  Awaiting consent.  Per pt report,  Filipe will pick pt up once procedure complete and ready for discharge.

## 2023-06-05 NOTE — DISCHARGE INSTRUCTIONS
[facility]                                      Radiology Discharge instructions Post Blood Patch Procedure         AFTER YOU GO HOME    Relax and take it easy for 24 hours  We suggest bedrest until the next morning, except to go to the bathroom.  You may resume normal activity tomorrow  You may remove the bandage on your back in the evening or next morning  You may resume bathing the next day  Drink at least 4 glasses of extra fluid today if not on a fluid restriction  DO NOT drive or operate machinery at home or at work for at least 24 hours                   CALL YOUR PRIMARY PHYSICIAN IF:  If you start to leak a large amount of fluid from the puncture site, lie down flat on your back. Call your primary physician, they will tell you if you need or return to the hospital  You develop a severe headache  You develop nausea or vomiting   You develop a temperature of 101 degrees or greater                 ADDITIONAL INSTRUCTIONS: None         [facility] RADIOLOGY DEPARTMENT             Procedure Physician:Dr. Mendez   Date of Procedure:June 5, 2023               [facility]...........[phone].......Ask for the Neuro Radiologist on call. Someone is on call 24 hour/day               [facility] Toll Free Number.........[phone].....Monday-Friday 8:00 am to 4:30 pm

## 2023-06-08 ENCOUNTER — CARE COORDINATION (OUTPATIENT)
Dept: NEUROLOGY | Facility: CLINIC | Age: 63
End: 2023-06-08
Payer: COMMERCIAL

## 2023-06-08 ENCOUNTER — TELEPHONE (OUTPATIENT)
Dept: NEUROLOGY | Facility: CLINIC | Age: 63
End: 2023-06-08
Payer: COMMERCIAL

## 2023-06-08 NOTE — TELEPHONE ENCOUNTER
Called pt and informed her I have called [facility] and they could not locate the referral so I have  refaxed the referral to Dr. Ken at [facility]. Gave her the telephone # to [facility] clinical coordinators and she appreciated that.

## 2023-06-08 NOTE — TELEPHONE ENCOUNTER
[facility] Call Center    Phone Message    May a detailed message be left on voicemail: yes     Reason for Call: Patient called states that she has not heard from [facility] to schedule with Dr. Charly Jimenez. Patient wondering what the hold up is?      Action Taken: Message routed to:  [facility]    Travel Screening: Not Applicable

## 2023-06-08 NOTE — TELEPHONE ENCOUNTER
Called [facility] at [phone] and spoke to Claribel and she stated they have not received pt's referral. Informed her we will fax over the referral again. Verified fax # and we are in synch. She stated pt can contact their clinical coordinators regarding scheduling at [phone]. Repeated # back to her.

## 2023-06-12 ENCOUNTER — CARE COORDINATION (OUTPATIENT)
Dept: NEUROLOGY | Facility: CLINIC | Age: 63
End: 2023-06-12
Payer: COMMERCIAL

## 2023-06-12 NOTE — PROGRESS NOTES
Per Dr Spears:  Thanks for sending the referral a second time.   Let's let them work through the referral process as we have sent a lot of patients their way.

## 2023-07-01 ENCOUNTER — HEALTH MAINTENANCE LETTER (OUTPATIENT)
Age: 63
End: 2023-07-01

## 2024-04-23 NOTE — PROGRESS NOTES
General acute hospital    Neurology Follow-Up    4/24/2024      Christa Huang MRN# [id number]   YOB: 1960 Age: 63 year old      Primary care provider:   Shannon Domingo    Follow-up 1-4-23, 5-26-23  Christa Huang is a 63 year old female with a past medical history of cervical spondylosis and myelopathy with prior C4-C6 fusion in 2012 with subsequent removal of that hardware and  ACDF from C6-C7 on 10-4-22.  Prior history of gastric sleeve surgery (2012).  She has had intractable neck and bilateral upper extremity pain and paresthesias that are worsened by being reclined.  She has had to sleep upright because reclining exacerbated the symptoms.  Symptoms are worse on the left side and are triggered by head turning to either direction as well as arm abduction. She has tenderness over the left sternocleidomastoid muscle and the left pectoralis minor tendon. 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  She may have both neurogenic and venous TOS as well as pectoralis minor syndrome.  Because of her severe cervical spondylosis and symptoms worse with head turning, concurrent radicular compression is also a possibility.  We discussed a strategy for evaluating each of these possibilities.      Recommendations:  1. US TOS/IJ  2. Trial ASM/SCM block for cervical dystonia  3. Pectoralis minor muscle block for pectoralis minor syndrome  3. Physical therapy for TOS  4. NCS/EMG      Interim History 5/26/2023:   She has not been able to lie flat for 2-3 years. She has been sleeping sitting up. She also notes that even in the early 1990's that when she would exert her head that her face would turn very red.      1-27-23: CTV venogram: With the head turned to the left there is increased mass effect on the left internal jugular vein at the level of C3-4 and between the adjacent musculature. The vein completely collapses.     With the head turned to the right there is increased  extradural mass effect on the right internal jugular vein with complete collapse at the level of C4-5 and between the adjacent musculature.      1-13-23: US TOS/IJ: Right: Subclavian vein occludes in 90 degrees, Internal jugular vein narrowing suggested in extension. No occlusion demonstrated.     3-30-23: PROCEDURE PERFORMED: Bilateral Anterior Scalene Muscle and Sternocleidomastoid Muscle (SCM) Block Under Ultrasound Guidance  Her pre--/post- symptoms procedure scores are as follows (0-10):  Tenderness: 10->8  Left upper extremity: 10->9  Right upper extremity: 8->7  Head pressure: 10->9  Improvements lasted until she drove home. She was not able to lie down.     She has not done physical therapy for TOS or SCM syndrome because she doesn't tolerate lying down.  She also reports that a physical therapist wouldn't treat her because of her not being able to lie down.   She doesn't want to try Botox.      Plan:  1. Lumbar puncture for measuring CSF pressure   2. Referral to Dr. Charly Ken for TOS and SCM syndrome.   3. Trial of acetazolamide if pressure is high    Interim History 4/24/2024:  5-31-23: LP with opening pressure [id number]. Had a post-LP headache and needed a blood patch on 6-5-23.  Did not want to try acetazolamide.     Has had a series of decompressive surgeries in the interim.   10-2-23: left sided thoracic outlet decompression and pectoralis minor tenotomy--Pros--able to lift arm up with less pain, did PT, no improvement in headaches  12-8-23 Right supraclavicular thoracic outlet decompression, Right anterior and middle scalenectomies, Right brachioplexus neurolysis--Pros able to lift arm up with less pain, did PT, no improvement in headache.  Resection of right 1st rib, Right pectoralis minor tenotomy   3-7-24 left internal jugular vein external vena lysis, left sternocleidomastoid partial myotomy and division of left omohyoid muscle--Pros-less pressure over the surgical site,   4-4-24 RIGHT  STERNOCLEIDOMASTOID MYOTOMY, RIGHT JUGULAR VEIN RELEASE--Pros less pressure over the surgical site.     Symptoms that have decreased: She is able to raise her arms without triggering the numbness and tingling in the arms. Her global sense of body numbness and tingling has been slightly better.    Symptoms that have increased: Not able to put her left arm behind the back or over the front of the body. This triggers pain in the left side of the head and into the base of the neck, down the shoulders, and down the back and shoulder bladder and into the chest, like someone sitting on the chest. The pain goes into the torso, down the abdomen, and into the left leg and left foot. This is worse sitting down, worse on the left side. There some new pain in the right shoulder and scalene area. She has some difficulty with exhaling,     Head pressure is significantly worse. She is more nauseous which increases sitting. It is better standing up but not lying down. She still needs to sleep sitting up. Her blood pressure is labile, typically has been low but her diastolic has recently been as high as 119.     Other symptoms that she conveyed:   She has had unexplained diarrhea (EGD, colonoscopy unclear source) and nausea. She has pelvic pressure and this pressure goes down the left leg.   She has had urinary incontinence, s/p 2 meshed surgery, no hematuria, no history of renal stone  She has teeth clenching  She has worsening vision, double vision, difficulty reading, difficulty driving, difficulty doing desk work.   She has dizziness characterized by a floating feeling, not room spinning, also worse sitting.   She has tinnitus, both ears, pulsatile, exertion will trigger heartbeat in the head, bending over can trigger the pressure in the head.   She has chronic fatigue  She has difficulty doing ADLs because of the pain  She has difficulty sleeping because of pain in the neck and arms  She has a raspy voice    ALLERGIES:      Allergies   Allergen Reactions    Avocado Anaphylaxis     Itching, Hives, Throat thickening    Banana Anaphylaxis     Hives, itching, throat scratchy    Fruit Extracts Anaphylaxis    Latex Anaphylaxis    Melon Anaphylaxis     Hives, itching, scratchy thickening throat      Benzocaine Nausea and Vomiting    Doxycycline Nausea    Duloxetine Dizziness    Food      Other reaction(s): *Unknown   Stated to stay away, also white potatoes    Mirtazapine Other (See Comments)     Unable to function    Penicillins Swelling    Adhesive Tape Rash     Paper tape ok    Benzodiazepines Nausea and Vomiting and Rash     Other reaction(s): Edema, Sedation  Not able to function then severe withdrawal      Bupropion Anxiety    Codeine Rash    Fluoxetine Anxiety, Diarrhea and Palpitations    Lanolin Rash    Sertraline Diarrhea and Palpitations    Venlafaxine Palpitations     Other reaction(s): Tachycardia        MEDICATIONS:    Current Outpatient Medications:     acetaZOLAMIDE (DIAMOX) 250 MG tablet, Take 1/2 tablet each morning. Increase by 1/2 tablet every 2-3 days until goal dose 2 tablets twice a day., Disp: 60 tablet, Rfl: 3    levothyroxine (SYNTHROID/LEVOTHROID) 75 MCG tablet, Take 1 tablet by mouth See Admin Instructions, Disp: , Rfl:     LEVOTHYROXINE SODIUM PO, Take 37.5 mcg by mouth daily on Mon and Fri. Pt takes 75 mcg all other days., Disp: , Rfl:     mirtazapine (REMERON) 15 MG tablet, Take 1 tablet (15 mg) by mouth At Bedtime, Disp: 30 tablet, Rfl: 0    Omega-3 Fatty Acids (OMEGA-3 FISH OIL PO), Take 1 g by mouth every evening Pt takes liquid formulation (Patient not taking: Reported on 1/4/2023), Disp: , Rfl:     Pediatric Multivit-Minerals-C (MULTIVITAMIN GUMMIES CHILDRENS) CHEW, Take 2 chew tab by mouth At Bedtime, Disp: , Rfl:     vitamin B complex with vitamin C (STRESS TAB) tablet, Take 1 tablet by mouth every evening (Patient not taking: Reported on 1/4/2023), Disp: , Rfl:      PHYSICAL EXAM:  Vitals: BP (!)  139/90   Pulse 76   Resp 16   SpO2 97%     General: Patient is well-nourished, well-groomed, here with , has a fresh right SCM myotomy scar on the right, a well-healed one on the left. She was very focused on having her concerns heard so most of the appointment was focused on listening to these concerns and discussing next steps.    Christa has undergone a lot of surgeries in the short amount of time but has not had any benefit from her core problem which is the inability to lie flat without triggering head and arm pain.  The arm pain with abduction has improved.  She continues to have symptoms that are exacerbated by sitting with a predominance of her symptoms being on the left side. I recommended is that we reimage her 2 months after her last surgery with a CT of the head and neck and an ultrasound to verify whether the head and neck venous system has been decompressed.      Secondly, because she has had predominantly left-sided symptoms and now a description of pelvic pressure and left leg pain, we should also rule out pelvic venous congestion and both nutcracker and May-Thurner syndrome.  Symptoms triggered by sitting when intra-abdominal pressure is raised as well and symptoms worsened by bending over to raise concerns of a abdominal pelvic source of compression.  She would have to have the CT of the abdomen and pelvis on a different day as the repeat CT of the head and neck.      I recommended returning to physical therapy and working on deep tissue myofascial work after the incisions have been well-healed.    I do think that also would feel better on a little bit of a carbonic anhydrase inhibitor.  We could do this at a very low dose and titrate up to effect.  She is still very much against using medications, however, because she has read about potential side effects.    She will discuss with her  and will send me a note if she is ready to move forward with the following  plan.    PLAN:  CTVenogram Abdomen and Pelvis to look for vein compression  CTVenogram of the head and neck and ultrasound as we did in 2022 to look for post operative changes, wait 2 months after last surgery  Physical therapy for TOS, myofascial release, and posture training  Consider trying a medication to lower intracranial pressure like methazolamide or acetazolamide    The longitudinal plan of care for the condition(s) below were addressed during this visit. Due to the added complexity in care, I will continue to support Christa in the subsequent management of this condition(s) and with the ongoing continuity of care of this condition(s).    46-minutes spent in evaluation, examination, and documentation

## 2024-04-24 ENCOUNTER — OFFICE VISIT (OUTPATIENT)
Dept: NEUROLOGY | Facility: CLINIC | Age: 64
End: 2024-04-24
Payer: COMMERCIAL

## 2024-04-24 VITALS
SYSTOLIC BLOOD PRESSURE: 139 MMHG | DIASTOLIC BLOOD PRESSURE: 90 MMHG | HEART RATE: 76 BPM | OXYGEN SATURATION: 97 % | RESPIRATION RATE: 16 BRPM

## 2024-04-24 DIAGNOSIS — I87.1 COMPRESSION OF VEIN: ICD-10-CM

## 2024-04-24 DIAGNOSIS — G24.3 CERVICAL DYSTONIA: ICD-10-CM

## 2024-04-24 DIAGNOSIS — G93.2 INCREASED INTRACRANIAL PRESSURE: Primary | ICD-10-CM

## 2024-04-24 DIAGNOSIS — G54.0 TOS (THORACIC OUTLET SYNDROME): ICD-10-CM

## 2024-04-24 DIAGNOSIS — M43.6 CONTRACTURE OF STERNOCLEIDOMASTOID MUSCLE: ICD-10-CM

## 2024-04-24 DIAGNOSIS — I87.1 NUTCRACKER PHENOMENON OF RENAL VEIN: ICD-10-CM

## 2024-04-24 PROCEDURE — G2211 COMPLEX E/M VISIT ADD ON: HCPCS | Performed by: PSYCHIATRY & NEUROLOGY

## 2024-04-24 PROCEDURE — 99215 OFFICE O/P EST HI 40 MIN: CPT | Performed by: PSYCHIATRY & NEUROLOGY

## 2024-04-24 RX ORDER — VITAMIN B COMPLEX
1000 TABLET ORAL
COMMUNITY

## 2024-04-24 ASSESSMENT — PAIN SCALES - GENERAL: PAINLEVEL: MODERATE PAIN (5)

## 2024-04-24 NOTE — PATIENT INSTRUCTIONS
Next Steps:  CTVenogram Abdomen and Pelvis to look for vein compression  CTVenogram of the head and neck and ultrasound as we did in 2022 to look for post operative changes, wait 2 months after last surgery  Physical therapy for TOS, myofascial release, and posture training  Consider trying a medication to lower intracranial pressure like methazolamide or acetazolamide

## 2024-04-24 NOTE — LETTER
4/24/2024       RE: Christa Huang  [address] [facility] [id number]       Dear Colleague,    Thank you for referring your patient, Christa Huang, to the [facility] [place] at [facility]. Please see a copy of my visit note below.    [facility]    Neurology Follow-Up    4/24/2024      Christa Huang MRN# [id number]   YOB: 1960 Age: 63 year old      Primary care provider:   Shannon Domingo    Follow-up 1-4-23, 5-26-23  Christa Huang is a 63 year old female with a past medical history of cervical spondylosis and myelopathy with prior C4-C6 fusion in 2012 with subsequent removal of that hardware and  ACDF from C6-C7 on 10-4-22.  Prior history of gastric sleeve surgery (2012).  She has had intractable neck and bilateral upper extremity pain and paresthesias that are worsened by being reclined.  She has had to sleep upright because reclining exacerbated the symptoms.  Symptoms are worse on the left side and are triggered by head turning to either direction as well as arm abduction. She has tenderness over the left sternocleidomastoid muscle and the left pectoralis minor tendon. 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  She may have both neurogenic and venous TOS as well as pectoralis minor syndrome.  Because of her severe cervical spondylosis and symptoms worse with head turning, concurrent radicular compression is also a possibility.  We discussed a strategy for evaluating each of these possibilities.      Recommendations:  1. US TOS/IJ  2. Trial ASM/SCM block for cervical dystonia  3. Pectoralis minor muscle block for pectoralis minor syndrome  3. Physical therapy for TOS  4. NCS/EMG      Interim History 5/26/2023:   She has not been able to lie flat for 2-3 years. She has been sleeping sitting up. She also notes that even in the early 1990's that when she would  exert her head that her face would turn very red.      1-27-23: CTV venogram: With the head turned to the left there is increased mass effect on the left internal jugular vein at the level of C3-4 and between the adjacent musculature. The vein completely collapses.     With the head turned to the right there is increased extradural mass effect on the right internal jugular vein with complete collapse at the level of C4-5 and between the adjacent musculature.      1-13-23: US TOS/IJ: Right: Subclavian vein occludes in 90 degrees, Internal jugular vein narrowing suggested in extension. No occlusion demonstrated.     3-30-23: PROCEDURE PERFORMED: Bilateral Anterior Scalene Muscle and Sternocleidomastoid Muscle (SCM) Block Under Ultrasound Guidance  Her pre--/post- symptoms procedure scores are as follows (0-10):  Tenderness: 10->8  Left upper extremity: 10->9  Right upper extremity: 8->7  Head pressure: 10->9  Improvements lasted until she drove home. She was not able to lie down.     She has not done physical therapy for TOS or SCM syndrome because she doesn't tolerate lying down.  She also reports that a physical therapist wouldn't treat her because of her not being able to lie down.   She doesn't want to try Botox.      Plan:  1. Lumbar puncture for measuring CSF pressure   2. Referral to Dr. Charly Ken for TOS and SCM syndrome.   3. Trial of acetazolamide if pressure is high    Interim History 4/24/2024:  5-31-23: LP with opening pressure [id number]. Had a post-LP headache and needed a blood patch on 6-5-23.  Did not want to try acetazolamide.     Has had a series of decompressive surgeries in the interim.   10-2-23: left sided thoracic outlet decompression and pectoralis minor tenotomy--Pros--able to lift arm up with less pain, did PT, no improvement in headaches  12-8-23 Right supraclavicular thoracic outlet decompression, Right anterior and middle scalenectomies, Right brachioplexus neurolysis--Pros able to lift  arm up with less pain, did PT, no improvement in headache.  Resection of right 1st rib, Right pectoralis minor tenotomy   3-7-24 left internal jugular vein external vena lysis, left sternocleidomastoid partial myotomy and division of left omohyoid muscle--Pros-less pressure over the surgical site,   4-4-24 RIGHT STERNOCLEIDOMASTOID MYOTOMY, RIGHT JUGULAR VEIN RELEASE--Pros less pressure over the surgical site.     Symptoms that have decreased: She is able to raise her arms without triggering the numbness and tingling in the arms. Her global sense of body numbness and tingling has been slightly better.    Symptoms that have increased: Not able to put her left arm behind the back or over the front of the body. This triggers pain in the left side of the head and into the base of the neck, down the shoulders, and down the back and shoulder bladder and into the chest, like someone sitting on the chest. The pain goes into the torso, down the abdomen, and into the left leg and left foot. This is worse sitting down, worse on the left side. There some new pain in the right shoulder and scalene area. She has some difficulty with exhaling,     Head pressure is significantly worse. She is more nauseous which increases sitting. It is better standing up but not lying down. She still needs to sleep sitting up. Her blood pressure is labile, typically has been low but her diastolic has recently been as high as 119.     Other symptoms that she conveyed:   She has had unexplained diarrhea (EGD, colonoscopy unclear source) and nausea. She has pelvic pressure and this pressure goes down the left leg.   She has had urinary incontinence, s/p 2 meshed surgery, no hematuria, no history of renal stone  She has teeth clenching  She has worsening vision, double vision, difficulty reading, difficulty driving, difficulty doing desk work.   She has dizziness characterized by a floating feeling, not room spinning, also worse sitting.   She has  tinnitus, both ears, pulsatile, exertion will trigger heartbeat in the head, bending over can trigger the pressure in the head.   She has chronic fatigue  She has difficulty doing ADLs because of the pain  She has difficulty sleeping because of pain in the neck and arms  She has a raspy voice    ALLERGIES:     Allergies   Allergen Reactions    Avocado Anaphylaxis     Itching, Hives, Throat thickening    Banana Anaphylaxis     Hives, itching, throat scratchy    Fruit Extracts Anaphylaxis    Latex Anaphylaxis    Melon Anaphylaxis     Hives, itching, scratchy thickening throat      Benzocaine Nausea and Vomiting    Doxycycline Nausea    Duloxetine Dizziness    Food      Other reaction(s): *Unknown   Stated to stay away, also white potatoes    Mirtazapine Other (See Comments)     Unable to function    Penicillins Swelling    Adhesive Tape Rash     Paper tape ok    Benzodiazepines Nausea and Vomiting and Rash     Other reaction(s): Edema, Sedation  Not able to function then severe withdrawal      Bupropion Anxiety    Codeine Rash    Fluoxetine Anxiety, Diarrhea and Palpitations    Lanolin Rash    Sertraline Diarrhea and Palpitations    Venlafaxine Palpitations     Other reaction(s): Tachycardia        MEDICATIONS:    Current Outpatient Medications:     acetaZOLAMIDE (DIAMOX) 250 MG tablet, Take 1/2 tablet each morning. Increase by 1/2 tablet every 2-3 days until goal dose 2 tablets twice a day., Disp: 60 tablet, Rfl: 3    levothyroxine (SYNTHROID/LEVOTHROID) 75 MCG tablet, Take 1 tablet by mouth See Admin Instructions, Disp: , Rfl:     LEVOTHYROXINE SODIUM PO, Take 37.5 mcg by mouth daily on Mon and Fri. Pt takes 75 mcg all other days., Disp: , Rfl:     mirtazapine (REMERON) 15 MG tablet, Take 1 tablet (15 mg) by mouth At Bedtime, Disp: 30 tablet, Rfl: 0    Omega-3 Fatty Acids (OMEGA-3 FISH OIL PO), Take 1 g by mouth every evening Pt takes liquid formulation (Patient not taking: Reported on 1/4/2023), Disp: , Rfl:      Pediatric Multivit-Minerals-C (MULTIVITAMIN GUMMIES CHILDRENS) CHEW, Take 2 chew tab by mouth At Bedtime, Disp: , Rfl:     vitamin B complex with vitamin C (STRESS TAB) tablet, Take 1 tablet by mouth every evening (Patient not taking: Reported on 1/4/2023), Disp: , Rfl:      PHYSICAL EXAM:  Vitals: BP (!) 139/90   Pulse 76   Resp 16   SpO2 97%     General: Patient is well-nourished, well-groomed, here with , has a fresh right SCM myotomy scar on the right, a well-healed one on the left. She was very focused on having her concerns heard so most of the appointment was focused on listening to these concerns and discussing next steps.    Christa has undergone a lot of surgeries in the short amount of time but has not had any benefit from her core problem which is the inability to lie flat without triggering head and arm pain.  The arm pain with abduction has improved.  She continues to have symptoms that are exacerbated by sitting with a predominance of her symptoms being on the left side. I recommended is that we reimage her 2 months after her last surgery with a CT of the head and neck and an ultrasound to verify whether the head and neck venous system has been decompressed.      Secondly, because she has had predominantly left-sided symptoms and now a description of pelvic pressure and left leg pain, we should also rule out pelvic venous congestion and both nutcracker and May-Thurner syndrome.  Symptoms triggered by sitting when intra-abdominal pressure is raised as well and symptoms worsened by bending over to raise concerns of a abdominal pelvic source of compression.  She would have to have the CT of the abdomen and pelvis on a different day as the repeat CT of the head and neck.      I recommended returning to physical therapy and working on deep tissue myofascial work after the incisions have been well-healed.    I do think that also would feel better on a little bit of a carbonic anhydrase inhibitor.   We could do this at a very low dose and titrate up to effect.  She is still very much against using medications, however, because she has read about potential side effects.    She will discuss with her  and will send me a note if she is ready to move forward with the following plan.    PLAN:  CTVenogram Abdomen and Pelvis to look for vein compression  CTVenogram of the head and neck and ultrasound as we did in 2022 to look for post operative changes, wait 2 months after last surgery  Physical therapy for TOS, myofascial release, and posture training  Consider trying a medication to lower intracranial pressure like methazolamide or acetazolamide    The longitudinal plan of care for the condition(s) below were addressed during this visit. Due to the added complexity in care, I will continue to support Christa in the subsequent management of this condition(s) and with the ongoing continuity of care of this condition(s).    46-minutes spent in evaluation, examination, and documentation          Again, thank you for allowing me to participate in the care of your patient.      Sincerely,    Avtar DOVER Cha, MD

## 2024-05-03 DIAGNOSIS — I87.1 COMPRESSION OF VEIN: Primary | ICD-10-CM

## 2024-05-03 DIAGNOSIS — G24.3 CERVICAL DYSTONIA: ICD-10-CM

## 2024-05-03 DIAGNOSIS — R42 VERTIGO: ICD-10-CM

## 2024-05-03 DIAGNOSIS — M43.6 CONTRACTURE OF STERNOCLEIDOMASTOID MUSCLE: ICD-10-CM

## 2024-05-03 DIAGNOSIS — G54.0 TOS (THORACIC OUTLET SYNDROME): ICD-10-CM

## 2024-05-03 DIAGNOSIS — I87.1 NUTCRACKER PHENOMENON OF RENAL VEIN: ICD-10-CM

## 2024-05-03 DIAGNOSIS — M54.2 NECK PAIN: ICD-10-CM

## 2024-05-03 DIAGNOSIS — I87.1 MAY-THURNER SYNDROME: ICD-10-CM

## 2024-05-07 ENCOUNTER — CARE COORDINATION (OUTPATIENT)
Dept: NEUROLOGY | Facility: CLINIC | Age: 64
End: 2024-05-07
Payer: COMMERCIAL

## 2024-05-14 ENCOUNTER — ANCILLARY PROCEDURE (OUTPATIENT)
Dept: CT IMAGING | Facility: CLINIC | Age: 64
End: 2024-05-14
Attending: PSYCHIATRY & NEUROLOGY
Payer: COMMERCIAL

## 2024-05-14 DIAGNOSIS — I87.1 COMPRESSION OF VEIN: ICD-10-CM

## 2024-05-14 DIAGNOSIS — I87.1 NUTCRACKER PHENOMENON OF RENAL VEIN: ICD-10-CM

## 2024-05-14 DIAGNOSIS — I87.1 MAY-THURNER SYNDROME: ICD-10-CM

## 2024-05-14 PROCEDURE — 74174 CTA ABD&PLVS W/CONTRAST: CPT | Mod: GC | Performed by: STUDENT IN AN ORGANIZED HEALTH CARE EDUCATION/TRAINING PROGRAM

## 2024-05-14 RX ORDER — IOPAMIDOL 755 MG/ML
90 INJECTION, SOLUTION INTRAVASCULAR ONCE
Status: COMPLETED | OUTPATIENT
Start: 2024-05-14 | End: 2024-05-14

## 2024-05-14 RX ADMIN — IOPAMIDOL 90 ML: 755 INJECTION, SOLUTION INTRAVASCULAR at 17:48

## 2024-05-14 NOTE — DISCHARGE INSTRUCTIONS

## 2024-06-03 ENCOUNTER — ANCILLARY PROCEDURE (OUTPATIENT)
Dept: ULTRASOUND IMAGING | Facility: CLINIC | Age: 64
End: 2024-06-03
Attending: PSYCHIATRY & NEUROLOGY
Payer: COMMERCIAL

## 2024-06-03 ENCOUNTER — ANCILLARY PROCEDURE (OUTPATIENT)
Dept: CT IMAGING | Facility: CLINIC | Age: 64
End: 2024-06-03
Attending: PSYCHIATRY & NEUROLOGY
Payer: COMMERCIAL

## 2024-06-03 DIAGNOSIS — I87.1 COMPRESSION OF VEIN: ICD-10-CM

## 2024-06-03 DIAGNOSIS — R42 VERTIGO: ICD-10-CM

## 2024-06-03 DIAGNOSIS — G54.0 TOS (THORACIC OUTLET SYNDROME): ICD-10-CM

## 2024-06-03 DIAGNOSIS — M54.2 NECK PAIN: ICD-10-CM

## 2024-06-03 PROCEDURE — 70496 CT ANGIOGRAPHY HEAD: CPT | Performed by: RADIOLOGY

## 2024-06-03 PROCEDURE — 70498 CT ANGIOGRAPHY NECK: CPT | Performed by: RADIOLOGY

## 2024-06-03 PROCEDURE — 93922 UPR/L XTREMITY ART 2 LEVELS: CPT | Performed by: RADIOLOGY

## 2024-06-03 PROCEDURE — 93970 EXTREMITY STUDY: CPT | Performed by: RADIOLOGY

## 2024-06-03 RX ORDER — IOPAMIDOL 755 MG/ML
70 INJECTION, SOLUTION INTRAVASCULAR ONCE
Status: COMPLETED | OUTPATIENT
Start: 2024-06-03 | End: 2024-06-03

## 2024-06-03 RX ADMIN — IOPAMIDOL 70 ML: 755 INJECTION, SOLUTION INTRAVASCULAR at 17:28

## 2024-06-03 NOTE — DISCHARGE INSTRUCTIONS

## 2024-06-04 ENCOUNTER — THERAPY VISIT (OUTPATIENT)
Dept: PHYSICAL THERAPY | Facility: CLINIC | Age: 64
End: 2024-06-04
Payer: COMMERCIAL

## 2024-06-04 DIAGNOSIS — G54.0 TOS (THORACIC OUTLET SYNDROME): ICD-10-CM

## 2024-06-04 DIAGNOSIS — M43.6 CONTRACTURE OF STERNOCLEIDOMASTOID MUSCLE: ICD-10-CM

## 2024-06-04 DIAGNOSIS — M54.2 CERVICAL PAIN: Primary | ICD-10-CM

## 2024-06-04 DIAGNOSIS — G24.3 CERVICAL DYSTONIA: ICD-10-CM

## 2024-06-04 DIAGNOSIS — I87.1 COMPRESSION OF VEIN: ICD-10-CM

## 2024-06-04 PROCEDURE — 97140 MANUAL THERAPY 1/> REGIONS: CPT | Mod: GP | Performed by: PHYSICAL THERAPIST

## 2024-06-04 PROCEDURE — 97530 THERAPEUTIC ACTIVITIES: CPT | Mod: GP | Performed by: PHYSICAL THERAPIST

## 2024-06-04 PROCEDURE — 97161 PT EVAL LOW COMPLEX 20 MIN: CPT | Mod: GP | Performed by: PHYSICAL THERAPIST

## 2024-06-04 NOTE — PROGRESS NOTES
PHYSICAL THERAPY EVALUATION  Type of Visit: Evaluation    See electronic medical record for Abuse and Falls Screening details.    Subjective   Christa Huang is a 63 year old female with a past medical history of neurogenic thoracic outlet syndrome, cervical spondylosis and myelopathy with prior C4-C6 fusion in 2012 with subsequent removal of that hardware and  ACDF from C6-C7 on 10-4-22. Underwent Right TOS surgery on 12/8/23, a left sternocleidomastoid partial myotomy and division of left omohyoid muscle on 3/7/24, and right sternocleidomastoid myotomy and right jugular vein release on 4/4/24.       Presenting condition or subjective complaint: post op surgery  Date of onset:      Relevant medical history:   pt does not list any relevant medical history  Dates & types of surgery: x    Prior diagnostic imaging/testing results: MRI; CT scan     US yesterday (see chart)     Prior therapy history for the same diagnosis, illness or injury:    yes, after surgery for TOS. Did not help.     Prior Level of Function  Transfers: Independent  Ambulation: Independent  ADL: Independent  IADL:     Living Environment  Social support: With family members   Type of home: House   Stairs to enter the home:         Ramp: No   Stairs inside the home: Yes       Help at home:    Equipment owned:       Employment:      Hobbies/Interests:      Patient goals for therapy: massage sugery area    Pain assessment: See objective evaluation for additional pain details     Objective   CERVICAL SPINE EVALUATION  Pain and tingling is located all over her body, in bilateral upper and lower extremities, all fingers and toes. She does not get headaches, but what she describes as a pressure in her whole head, not focused on one area more than another. No specific movement makes her pain worse or better. Moving in general increases her pain.  Subjective and objective evaluation limited today.   INTEGUMENTARY (edema, incisions):  incisions WNL  POSTURE:  WNL  GAIT:   Weightbearing Status: WBAT  Assistive Device(s): None  Gait Deviations: WNL    FLEXIBILITY:  decreased bilat levator, UT, SCM, pec minor    ROM: Will try to assess ROM next session  PALPATION:  tender to palpation bilat levator, UT, SCM    Assessment & Plan   CLINICAL IMPRESSIONS  Medical Diagnosis: C/T, B UE pain    Treatment Diagnosis: C/T, B UE pain   Impression/Assessment: Patient is a 63 year old female with cervical, thoracic, bilat upper extremity complaints.  The following significant findings have been identified: Pain, Decreased ROM/flexibility, Decreased joint mobility, Decreased strength, and Impaired posture. These impairments interfere with their ability to perform self care tasks, work tasks, recreational activities, household chores, driving , household mobility, and community mobility as compared to previous level of function.     Clinical Decision Making (Complexity):  Clinical Presentation: Stable/Uncomplicated  Clinical Presentation Rationale: based on medical and personal factors listed in PT evaluation  Clinical Decision Making (Complexity): Low complexity    PLAN OF CARE  Treatment Interventions:  Interventions: Manual Therapy, Neuromuscular Re-education, Therapeutic Activity, Therapeutic Exercise    Long Term Goals            Frequency of Treatment: 1x/week  Duration of Treatment: 3 weeks    Recommended Referrals to Other Professionals:  potentially a massage therapist - pt feels she would most benefit from massage, physical therapy hasn't helped in the past.  Education Assessment:   Learner/Method: Patient    Risks and benefits of evaluation/treatment have been explained.   Patient/Family/caregiver agrees with Plan of Care.     Evaluation Time:     PT Eval, Low Complexity Minutes (29646): 20       Signing Clinician: Jennie Perez PT

## 2024-06-14 ENCOUNTER — THERAPY VISIT (OUTPATIENT)
Dept: PHYSICAL THERAPY | Facility: CLINIC | Age: 64
End: 2024-06-14
Payer: COMMERCIAL

## 2024-06-14 DIAGNOSIS — M54.2 CERVICAL PAIN: Primary | ICD-10-CM

## 2024-06-14 PROCEDURE — 97140 MANUAL THERAPY 1/> REGIONS: CPT | Mod: GP | Performed by: PHYSICAL THERAPIST

## 2024-06-14 PROCEDURE — 97530 THERAPEUTIC ACTIVITIES: CPT | Mod: GP | Performed by: PHYSICAL THERAPIST

## 2024-06-19 ENCOUNTER — TELEPHONE (OUTPATIENT)
Dept: NEUROLOGY | Facility: CLINIC | Age: 64
End: 2024-06-19
Payer: COMMERCIAL

## 2024-06-19 NOTE — TELEPHONE ENCOUNTER
Left Voicemail (1st Attempt) and Sent Mychart (1st Attempt) for the patient to call back and schedule the following:    Appointment type: Resched June 28 appt  Provider: Dr. Spears  Return date: June 24 at 12:30  Specialty phone number: [phone]  Additional appointment(s) needed:   Additonal Notes:     Please reschedule the pt's 6/28 appt with Dr. Spears to 6/24 at 12:30 for a virtual.

## 2024-06-21 ENCOUNTER — THERAPY VISIT (OUTPATIENT)
Dept: PHYSICAL THERAPY | Facility: CLINIC | Age: 64
End: 2024-06-21
Payer: COMMERCIAL

## 2024-06-21 DIAGNOSIS — M54.2 CERVICAL PAIN: Primary | ICD-10-CM

## 2024-06-21 PROCEDURE — 97140 MANUAL THERAPY 1/> REGIONS: CPT | Mod: GP | Performed by: PHYSICAL THERAPIST

## 2024-06-21 PROCEDURE — 97110 THERAPEUTIC EXERCISES: CPT | Mod: GP | Performed by: PHYSICAL THERAPIST

## 2024-06-24 ENCOUNTER — VIRTUAL VISIT (OUTPATIENT)
Dept: NEUROLOGY | Facility: CLINIC | Age: 64
End: 2024-06-24
Payer: COMMERCIAL

## 2024-06-24 ENCOUNTER — TELEPHONE (OUTPATIENT)
Dept: NEUROLOGY | Facility: CLINIC | Age: 64
End: 2024-06-24

## 2024-06-24 VITALS — HEIGHT: 63 IN | BODY MASS INDEX: 30.12 KG/M2 | WEIGHT: 170 LBS

## 2024-06-24 DIAGNOSIS — G93.2 INCREASED INTRACRANIAL PRESSURE: ICD-10-CM

## 2024-06-24 DIAGNOSIS — I87.1 COMPRESSION OF VEIN: ICD-10-CM

## 2024-06-24 DIAGNOSIS — I99.8 ISCHEMIA: ICD-10-CM

## 2024-06-24 DIAGNOSIS — M24.20 EAGLE'S SYNDROME: Primary | ICD-10-CM

## 2024-06-24 PROCEDURE — 99215 OFFICE O/P EST HI 40 MIN: CPT | Mod: 95 | Performed by: PSYCHIATRY & NEUROLOGY

## 2024-06-24 ASSESSMENT — PAIN SCALES - GENERAL: PAINLEVEL: SEVERE PAIN (6)

## 2024-06-24 NOTE — LETTER
6/24/2024       RE: Christa Huang  [address] [facility] [id number]     Dear Colleague,    Thank you for referring your patient, Christa Huang, to the [facility] [place] [facility]. Please see a copy of my visit note below.    The patient is being evaluated via a billable video visit.    How would you like to obtain your AVS? MyChart  If the video visit is dropped, the invitation should be resent by: Send to e-mail at: [email or web address]  Will anyone else be joining your video visit? No        Follow-up 1-4-23, 5-26-23, 4-24-24  Christa Huang is a 63 year old female with a past medical history of cervical spondylosis and myelopathy with prior C4-C6 fusion in 2012 with subsequent removal of that hardware and  ACDF from C6-C7 on 10-4-22.  Prior history of gastric sleeve surgery (2012).  She has had intractable neck and bilateral upper extremity pain and paresthesias that are worsened by being reclined.  She has had to sleep upright because reclining exacerbated the symptoms.  Symptoms are worse on the left side and are triggered by head turning to either direction as well as arm abduction. She has tenderness over the left sternocleidomastoid muscle and the left pectoralis minor tendon. 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  She may have both neurogenic and venous TOS as well as pectoralis minor syndrome.  Because of her severe cervical spondylosis and symptoms worse with head turning, concurrent radicular compression is also a possibility.  We discussed a strategy for evaluating each of these possibilities.      Recommendations:  1. US TOS/IJ  2. Trial ASM/SCM block for cervical dystonia  3. Pectoralis minor muscle block for pectoralis minor syndrome  3. Physical therapy for TOS  4. NCS/EMG      Interim History 5/26/2023:   She has not been able to lie flat for 2-3 years. She has been sleeping sitting  up. She also notes that even in the early 1990's that when she would exert her head that her face would turn very red.      1-27-23: CTV venogram: With the head turned to the left there is increased mass effect on the left internal jugular vein at the level of C3-4 and between the adjacent musculature. The vein completely collapses.     With the head turned to the right there is increased extradural mass effect on the right internal jugular vein with complete collapse at the level of C4-5 and between the adjacent musculature.      1-13-23: US TOS/IJ: Right: Subclavian vein occludes in 90 degrees, Internal jugular vein narrowing suggested in extension. No occlusion demonstrated.     3-30-23: PROCEDURE PERFORMED: Bilateral Anterior Scalene Muscle and Sternocleidomastoid Muscle (SCM) Block Under Ultrasound Guidance  Her pre--/post- symptoms procedure scores are as follows (0-10):  Tenderness: 10->8  Left upper extremity: 10->9  Right upper extremity: 8->7  Head pressure: 10->9  Improvements lasted until she drove home. She was not able to lie down.     She has not done physical therapy for TOS or SCM syndrome because she doesn't tolerate lying down.  She also reports that a physical therapist wouldn't treat her because of her not being able to lie down.   She doesn't want to try Botox.      Plan:  1. Lumbar puncture for measuring CSF pressure   2. Referral to Dr. Charly Ken for TOS and SCM syndrome.   3. Trial of acetazolamide if pressure is high     Interim History 4/24/2024:  5-31-23: LP with opening pressure [id number]. Had a post-LP headache and needed a blood patch on 6-5-23.  Did not want to try acetazolamide.      Has had a series of decompressive surgeries in the interim.   10-2-23: left sided thoracic outlet decompression and pectoralis minor tenotomy--Pros--able to lift arm up with less pain, did PT, no improvement in headaches  12-8-23 Right supraclavicular thoracic outlet decompression, Right anterior and  middle scalenectomies, Right brachioplexus neurolysis--Pros able to lift arm up with less pain, did PT, no improvement in headache.  Resection of right 1st rib, Right pectoralis minor tenotomy   3-7-24 left internal jugular vein external vena lysis, left sternocleidomastoid partial myotomy and division of left omohyoid muscle--Pros-less pressure over the surgical site,   4-4-24 RIGHT STERNOCLEIDOMASTOID MYOTOMY, RIGHT JUGULAR VEIN RELEASE--Pros less pressure over the surgical site.      Symptoms that have decreased: She is able to raise her arms without triggering the numbness and tingling in the arms. Her global sense of body numbness and tingling has been slightly better.     Symptoms that have increased: Not able to put her left arm behind the back or over the front of the body. This triggers pain in the left side of the head and into the base of the neck, down the shoulders, and down the back and shoulder bladder and into the chest, like someone sitting on the chest. The pain goes into the torso, down the abdomen, and into the left leg and left foot. This is worse sitting down, worse on the left side. There some new pain in the right shoulder and scalene area. She has some difficulty with exhaling,      Head pressure is significantly worse. She is more nauseous which increases sitting. It is better standing up but not lying down. She still needs to sleep sitting up. Her blood pressure is labile, typically has been low but her diastolic has recently been as high as 119.      Other symptoms that she conveyed:   She has had unexplained diarrhea (EGD, colonoscopy unclear source) and nausea. She has pelvic pressure and this pressure goes down the left leg.   She has had urinary incontinence, s/p 2 meshed surgery, no hematuria, no history of renal stone  She has teeth clenching  She has worsening vision, double vision, difficulty reading, difficulty driving, difficulty doing desk work.   She has dizziness  characterized by a floating feeling, not room spinning, also worse sitting.   She has tinnitus, both ears, pulsatile, exertion will trigger heartbeat in the head, bending over can trigger the pressure in the head.   She has chronic fatigue  She has difficulty doing ADLs because of the pain  She has difficulty sleeping because of pain in the neck and arms  She has a raspy voice     PLAN:  CTVenogram Abdomen and Pelvis to look for vein compression  CTVenogram of the head and neck and ultrasound as we did in 2022 to look for post operative changes, wait 2 months after last surgery  Physical therapy for TOS, myofascial release, and posture training  Consider trying a medication to lower intracranial pressure like methazolamide or acetazolamide    Interim History 6/24/2024:  CTV Abdomen Pelvis w Contrast 5-14-24    Unremarkable appearance of the abdomen and pelvis.   No evidence of Nutcracker syndrome, May Thurner, or pelvic congestion syndrome.    CTV HEAD NECK W CONTRAST 6/3/2024   Left: Repeat imaging with head turned to left reveals worsened in caliber of the internal jugular vein.  Right: In head turned to right position, there is severe narrowing of the upper and mid internal jugular vein,    She still feels the pressure in the head, feeling like she being choked. The pressure goes down the arm, abdomen, legs.  We discussed next steps with management, would include a referral for evaluation for styloidectomy.  We discussed next steps in her evaluation.     PLAN:  Referral for styloidectomy evaluation  Trial of Brilinta 2 months  Consider methazolamide next if Brilinta not tolerated    DATA:  I personally reviewed the following data.  CTV HEAD NECK W CONTRAST 6/3/2024     History:  63F severe head pressure, s/p bilateral SCM myotomies,  previous IJV compression with head turning; Compression of vein; Neck  pain; Vertigo.    Findings:    HEAD:  Head CTV demonstrates no definite occlusion or thrombus within the  major  dural and deep intracranial venous sinuses. There is no definite  intracranial hemorrhage, mass affect, or midline shift. The major  intracranial arteries are grossly patent without definite aneurysm or  stenosis.    NECK  No internal jugular vein thrombosis on the left or right.     Left:  Styloid process measures: 2.1 cm. In head turned to right position,  there is mild narrowing of the upper internal jugular vein at the  skull base anterior to the C1 transverse process, no narrowing of the  lower internal jugular vein.    Repeat imaging with head turned to left  reveals worsened in caliber  of the internal jugular vein.    Right:  Styloid process measures: 2.5 cm. In head turned to right  position,  there is severe narrowing of the upper and mid internal jugular vein,  no narrowing of the lower internal jugular vein.    Repeat imaging with head turned to left reveals improved in caliber of  the internal jugular vein.    Evaluation of the soft tissues of the neck reveals normal appearing  soft tissues, no lymphadenopathy. Surgical changes of anterior fusion  C6-7. The visualized lung apices appear unremarkable.   Impression:  1.  CTV of the head reveals no intracranial venous thrombus or  stenosis.  2.  CTV of the neck reveals no venous thrombosis.    3.  In head turned to right  position, there is severe narrowing of  the upper and mid internal jugular vein, which is improved with head  turned left. In head turned to right position, there is mild narrowing  of the upper internal jugular vein, which has worsened with head turn  left. Findings are of indeterminate clinical significance.    CTV Abdomen Pelvis w Contrast 5/14/24   The abdominal aorta is normal in caliber.      There is no apparent compression of the left renal vein as it crosses  underneath the superior mesenteric artery.     There is no evidence of compression of the left common iliac vein as  it crosses underneath the right common iliac artery.      Unremarkable appearance of the pelvic vasculature. No pelvic  varicosities visualized.     The celiac axis, SMA and DORA are patent. The renal arteries are patent  bilaterally. Accessory left hepatic artery. Artery replaced right  hepatic artery arising from SMA. 3 renal arteries on the left.      The splenic vein, portal vein, SMV and renal veins are  patent. The  iliac veins are patent.  The hepatic veins and IVC are patent.     Abdomen and pelvis:      No suspicious osseous lesion. No suspicious for parenchymal lesion.     Postsurgical changes of sleeve gastrectomy. No distended or dilated  loops of large or small bowel. The appendix is unremarkable. No free  air or free fluid.                                                                    Impression:  Unremarkable appearance of the abdomen and pelvis. No evidence of  Nutcracker syndrome, May Thurner, or pelvic congestion syndrome.    41-minutes were spent in evaluation, counseling, and documentation on the date of service.        Again, thank you for allowing me to participate in the care of your patient.      Sincerely,    Avtar DOVER Cha, MD

## 2024-06-24 NOTE — PROGRESS NOTES
The patient is being evaluated via a billable video visit.    How would you like to obtain your AVS? MyChart  If the video visit is dropped, the invitation should be resent by: Send to e-mail at: [email or web address]  Will anyone else be joining your video visit? No      Video-Visit Details  Type of service:  Video Visit  Video Start Time:3:44 PM  Video End Time:4:19 PM  Originating Location (pt. Location): Home  Distant Location (provider location):  [facility] NEUROLOGY [facility]   Platform used for Video Visit: [facility]    Follow-up 1-4-23, 5-26-23, 4-24-24  Christa Huang is a 63 year old female with a past medical history of cervical spondylosis and myelopathy with prior C4-C6 fusion in 2012 with subsequent removal of that hardware and  ACDF from C6-C7 on 10-4-22.  Prior history of gastric sleeve surgery (2012).  She has had intractable neck and bilateral upper extremity pain and paresthesias that are worsened by being reclined.  She has had to sleep upright because reclining exacerbated the symptoms.  Symptoms are worse on the left side and are triggered by head turning to either direction as well as arm abduction. She has tenderness over the left sternocleidomastoid muscle and the left pectoralis minor tendon. 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  She may have both neurogenic and venous TOS as well as pectoralis minor syndrome.  Because of her severe cervical spondylosis and symptoms worse with head turning, concurrent radicular compression is also a possibility.  We discussed a strategy for evaluating each of these possibilities.      Recommendations:  1. US TOS/IJ  2. Trial ASM/SCM block for cervical dystonia  3. Pectoralis minor muscle block for pectoralis minor syndrome  3. Physical therapy for TOS  4. NCS/EMG      Interim History 5/26/2023:   She has not been able to lie flat for 2-3 years. She has been sleeping sitting up. She also notes that even in the early  1990's that when she would exert her head that her face would turn very red.      1-27-23: CTV venogram: With the head turned to the left there is increased mass effect on the left internal jugular vein at the level of C3-4 and between the adjacent musculature. The vein completely collapses.     With the head turned to the right there is increased extradural mass effect on the right internal jugular vein with complete collapse at the level of C4-5 and between the adjacent musculature.      1-13-23: US TOS/IJ: Right: Subclavian vein occludes in 90 degrees, Internal jugular vein narrowing suggested in extension. No occlusion demonstrated.     3-30-23: PROCEDURE PERFORMED: Bilateral Anterior Scalene Muscle and Sternocleidomastoid Muscle (SCM) Block Under Ultrasound Guidance  Her pre--/post- symptoms procedure scores are as follows (0-10):  Tenderness: 10->8  Left upper extremity: 10->9  Right upper extremity: 8->7  Head pressure: 10->9  Improvements lasted until she drove home. She was not able to lie down.     She has not done physical therapy for TOS or SCM syndrome because she doesn't tolerate lying down.  She also reports that a physical therapist wouldn't treat her because of her not being able to lie down.   She doesn't want to try Botox.      Plan:  1. Lumbar puncture for measuring CSF pressure   2. Referral to Dr. Charly Ken for TOS and SCM syndrome.   3. Trial of acetazolamide if pressure is high     Interim History 4/24/2024:  5-31-23: LP with opening pressure [id number]. Had a post-LP headache and needed a blood patch on 6-5-23.  Did not want to try acetazolamide.      Has had a series of decompressive surgeries in the interim.   10-2-23: left sided thoracic outlet decompression and pectoralis minor tenotomy--Pros--able to lift arm up with less pain, did PT, no improvement in headaches  12-8-23 Right supraclavicular thoracic outlet decompression, Right anterior and middle scalenectomies, Right brachioplexus  neurolysis--Pros able to lift arm up with less pain, did PT, no improvement in headache.  Resection of right 1st rib, Right pectoralis minor tenotomy   3-7-24 left internal jugular vein external vena lysis, left sternocleidomastoid partial myotomy and division of left omohyoid muscle--Pros-less pressure over the surgical site,   4-4-24 RIGHT STERNOCLEIDOMASTOID MYOTOMY, RIGHT JUGULAR VEIN RELEASE--Pros less pressure over the surgical site.      Symptoms that have decreased: She is able to raise her arms without triggering the numbness and tingling in the arms. Her global sense of body numbness and tingling has been slightly better.     Symptoms that have increased: Not able to put her left arm behind the back or over the front of the body. This triggers pain in the left side of the head and into the base of the neck, down the shoulders, and down the back and shoulder bladder and into the chest, like someone sitting on the chest. The pain goes into the torso, down the abdomen, and into the left leg and left foot. This is worse sitting down, worse on the left side. There some new pain in the right shoulder and scalene area. She has some difficulty with exhaling,      Head pressure is significantly worse. She is more nauseous which increases sitting. It is better standing up but not lying down. She still needs to sleep sitting up. Her blood pressure is labile, typically has been low but her diastolic has recently been as high as 119.      Other symptoms that she conveyed:   She has had unexplained diarrhea (EGD, colonoscopy unclear source) and nausea. She has pelvic pressure and this pressure goes down the left leg.   She has had urinary incontinence, s/p 2 meshed surgery, no hematuria, no history of renal stone  She has teeth clenching  She has worsening vision, double vision, difficulty reading, difficulty driving, difficulty doing desk work.   She has dizziness characterized by a floating feeling, not room spinning,  also worse sitting.   She has tinnitus, both ears, pulsatile, exertion will trigger heartbeat in the head, bending over can trigger the pressure in the head.   She has chronic fatigue  She has difficulty doing ADLs because of the pain  She has difficulty sleeping because of pain in the neck and arms  She has a raspy voice     PLAN:  CTVenogram Abdomen and Pelvis to look for vein compression  CTVenogram of the head and neck and ultrasound as we did in 2022 to look for post operative changes, wait 2 months after last surgery  Physical therapy for TOS, myofascial release, and posture training  Consider trying a medication to lower intracranial pressure like methazolamide or acetazolamide    Interim History 6/24/2024:  CTV Abdomen Pelvis w Contrast 5-14-24    Unremarkable appearance of the abdomen and pelvis.   No evidence of Nutcracker syndrome, May Thurner, or pelvic congestion syndrome.    CTV HEAD NECK W CONTRAST 6/3/2024   Left: Repeat imaging with head turned to left reveals worsened in caliber of the internal jugular vein.  Right: In head turned to right position, there is severe narrowing of the upper and mid internal jugular vein,    She still feels the pressure in the head, feeling like she being choked. The pressure goes down the arm, abdomen, legs.  We discussed next steps with management, would include a referral for evaluation for styloidectomy.  We discussed next steps in her evaluation.     PLAN:  Referral for styloidectomy evaluation  Trial of Brilinta 2 months  Consider methazolamide next if Brilinta not tolerated    DATA:  I personally reviewed the following data.  CTV HEAD NECK W CONTRAST 6/3/2024     History:  63F severe head pressure, s/p bilateral SCM myotomies,  previous IJV compression with head turning; Compression of vein; Neck  pain; Vertigo.    Findings:    HEAD:  Head CTV demonstrates no definite occlusion or thrombus within the  major dural and deep intracranial venous sinuses. There is no  definite  intracranial hemorrhage, mass affect, or midline shift. The major  intracranial arteries are grossly patent without definite aneurysm or  stenosis.    NECK  No internal jugular vein thrombosis on the left or right.     Left:  Styloid process measures: 2.1 cm. In head turned to right position,  there is mild narrowing of the upper internal jugular vein at the  skull base anterior to the C1 transverse process, no narrowing of the  lower internal jugular vein.    Repeat imaging with head turned to left  reveals worsened in caliber  of the internal jugular vein.    Right:  Styloid process measures: 2.5 cm. In head turned to right  position,  there is severe narrowing of the upper and mid internal jugular vein,  no narrowing of the lower internal jugular vein.    Repeat imaging with head turned to left reveals improved in caliber of  the internal jugular vein.    Evaluation of the soft tissues of the neck reveals normal appearing  soft tissues, no lymphadenopathy. Surgical changes of anterior fusion  C6-7. The visualized lung apices appear unremarkable.   Impression:  1.  CTV of the head reveals no intracranial venous thrombus or  stenosis.  2.  CTV of the neck reveals no venous thrombosis.    3.  In head turned to right  position, there is severe narrowing of  the upper and mid internal jugular vein, which is improved with head  turned left. In head turned to right position, there is mild narrowing  of the upper internal jugular vein, which has worsened with head turn  left. Findings are of indeterminate clinical significance.    CTV Abdomen Pelvis w Contrast 5/14/24   The abdominal aorta is normal in caliber.      There is no apparent compression of the left renal vein as it crosses  underneath the superior mesenteric artery.     There is no evidence of compression of the left common iliac vein as  it crosses underneath the right common iliac artery.     Unremarkable appearance of the pelvic vasculature. No  pelvic  varicosities visualized.     The celiac axis, SMA and DORA are patent. The renal arteries are patent  bilaterally. Accessory left hepatic artery. Artery replaced right  hepatic artery arising from SMA. 3 renal arteries on the left.      The splenic vein, portal vein, SMV and renal veins are  patent. The  iliac veins are patent.  The hepatic veins and IVC are patent.     Abdomen and pelvis:      No suspicious osseous lesion. No suspicious for parenchymal lesion.     Postsurgical changes of sleeve gastrectomy. No distended or dilated  loops of large or small bowel. The appendix is unremarkable. No free  air or free fluid.                                                                    Impression:  Unremarkable appearance of the abdomen and pelvis. No evidence of  Nutcracker syndrome, May Thurner, or pelvic congestion syndrome.    41-minutes were spent in evaluation, counseling, and documentation on the date of service.

## 2024-06-24 NOTE — TELEPHONE ENCOUNTER
Caller spoke with the patient to move todays appt from 12:30 to 3:30 per Dr. Spears.    Pt will call back, pt asked to speak to me (Kanika) once they call back. Please transfer them to me.    Thank You     6/24/24 BD

## 2024-06-24 NOTE — NURSING NOTE
Is the patient currently in the state of [place]? YES    Visit mode:VIDEO    If the visit is dropped, the patient can be reconnected by: VIDEO VISIT: Text to cell phone:   Telephone Information:   Mobile [phone]       Will anyone else be joining the visit? NO  (If patient encounters technical issues they should call [phone])    How would you like to obtain your AVS? MyChart    Are changes needed to the allergy or medication list? No    Are refills needed on medications prescribed by this physician? NO    Reason for visit: Follow-up     Rocio SANTOS

## 2024-06-27 ENCOUNTER — TELEPHONE (OUTPATIENT)
Dept: NEUROLOGY | Facility: CLINIC | Age: 64
End: 2024-06-27
Payer: COMMERCIAL

## 2024-06-27 NOTE — TELEPHONE ENCOUNTER
Patient confirmed scheduled appointment:  Date: 12/27/2024  Time: 4:30pm  Visit type: Return Neurology  Provider: Tory  Location: virtual  Testing/imaging:   Additional notes:     Radha Velez on 6/27/2024 at 5:29 PM

## 2024-07-15 ENCOUNTER — TELEPHONE (OUTPATIENT)
Dept: NEUROLOGY | Facility: CLINIC | Age: 64
End: 2024-07-15
Payer: COMMERCIAL

## 2024-07-15 NOTE — TELEPHONE ENCOUNTER
Patient confirmed scheduled appointment:  Date: 1/3/2025  Time: 4:30pm  Visit type: Return Neurology  Provider: Tory  Location: virtual  Testing/imaging:   Additional notes: R/S 12/27 emir Velez on 7/15/2024 at 3:18 PM

## 2024-07-30 ENCOUNTER — TELEPHONE (OUTPATIENT)
Dept: NEUROLOGY | Facility: CLINIC | Age: 64
End: 2024-07-30
Payer: COMMERCIAL

## 2024-07-30 NOTE — TELEPHONE ENCOUNTER
Health Call Center    Phone Message    May a detailed message be left on voicemail: yes     Reason for Call: Medication Question or concern regarding medication   Prescription Clarification  Name of Medication: Brilinta 60 mg   Prescribing Provider:    Pharmacy: [facility] [address], [place] [address]   What on the order needs clarification?  Patient called states that she is down to 3 days left of medication, patient has changed pharmacy and is requesting for refills to go to [facility] as listed above.      Action Taken: Message routed to:  [facility]    Travel Screening: Not Applicable     Date of Service:

## 2024-08-01 NOTE — TELEPHONE ENCOUNTER
[facility] Call Center    Phone Message    May a detailed message be left on voicemail: no     Reason for Call: Other: Emely from [facility] stated that the [facility] pharmacy is currently closed due to not having a pharmacist. Emely is requesting a verbal prescription called to [phone] for Christa's Brilanta.    Emely stated that Christa is out of this medication.     Action Taken: Message routed to:  [facility]    Travel Screening: Not Applicable     Date of Service:

## 2024-08-24 ENCOUNTER — HEALTH MAINTENANCE LETTER (OUTPATIENT)
Age: 64
End: 2024-08-24

## 2024-08-28 DIAGNOSIS — I99.8 ISCHEMIA: Primary | ICD-10-CM

## 2024-09-24 DIAGNOSIS — G93.2 INCREASED INTRACRANIAL PRESSURE: ICD-10-CM

## 2024-09-24 DIAGNOSIS — M24.20 EAGLE'S SYNDROME: Primary | ICD-10-CM

## 2024-09-24 DIAGNOSIS — I87.1 COMPRESSION OF VEIN: ICD-10-CM

## 2024-10-02 DIAGNOSIS — I87.1 VEIN STENOSIS: ICD-10-CM

## 2024-10-02 DIAGNOSIS — M54.2 CERVICAL PAIN: Primary | ICD-10-CM

## 2024-10-18 ENCOUNTER — HOSPITAL ENCOUNTER (OUTPATIENT)
Facility: CLINIC | Age: 64
Discharge: HOME | End: 2024-10-18
Attending: STUDENT IN AN ORGANIZED HEALTH CARE EDUCATION/TRAINING PROGRAM | Admitting: STUDENT IN AN ORGANIZED HEALTH CARE EDUCATION/TRAINING PROGRAM
Payer: COMMERCIAL

## 2024-10-18 ENCOUNTER — APPOINTMENT (OUTPATIENT)
Dept: INTERVENTIONAL RADIOLOGY/VASCULAR | Facility: CLINIC | Age: 64
End: 2024-10-18
Attending: STUDENT IN AN ORGANIZED HEALTH CARE EDUCATION/TRAINING PROGRAM
Payer: COMMERCIAL

## 2024-10-18 ENCOUNTER — APPOINTMENT (OUTPATIENT)
Dept: GENERAL RADIOLOGY | Facility: CLINIC | Age: 64
End: 2024-10-18
Attending: STUDENT IN AN ORGANIZED HEALTH CARE EDUCATION/TRAINING PROGRAM
Payer: COMMERCIAL

## 2024-10-18 VITALS — OXYGEN SATURATION: 96 % | SYSTOLIC BLOOD PRESSURE: 145 MMHG | DIASTOLIC BLOOD PRESSURE: 79 MMHG | HEART RATE: 70 BPM

## 2024-10-18 DIAGNOSIS — M54.2 CERVICAL PAIN: ICD-10-CM

## 2024-10-18 DIAGNOSIS — I87.1 VEIN STENOSIS: ICD-10-CM

## 2024-10-18 PROCEDURE — 250N000011 HC RX IP 250 OP 636: Performed by: STUDENT IN AN ORGANIZED HEALTH CARE EDUCATION/TRAINING PROGRAM

## 2024-10-18 PROCEDURE — C1769 GUIDE WIRE: HCPCS

## 2024-10-18 PROCEDURE — 272N000566 HC SHEATH CR3

## 2024-10-18 PROCEDURE — 75870 VEIN X-RAY SKULL: CPT | Mod: 26 | Performed by: STUDENT IN AN ORGANIZED HEALTH CARE EDUCATION/TRAINING PROGRAM

## 2024-10-18 PROCEDURE — C1887 CATHETER, GUIDING: HCPCS

## 2024-10-18 PROCEDURE — 76937 US GUIDE VASCULAR ACCESS: CPT | Mod: 26 | Performed by: STUDENT IN AN ORGANIZED HEALTH CARE EDUCATION/TRAINING PROGRAM

## 2024-10-18 PROCEDURE — 272N000504 HC NEEDLE CR4

## 2024-10-18 PROCEDURE — 250N000009 HC RX 250: Performed by: STUDENT IN AN ORGANIZED HEALTH CARE EDUCATION/TRAINING PROGRAM

## 2024-10-18 PROCEDURE — 75860 VEIN X-RAY NECK: CPT | Mod: 50

## 2024-10-18 PROCEDURE — 36012 PLACE CATHETER IN VEIN: CPT | Performed by: STUDENT IN AN ORGANIZED HEALTH CARE EDUCATION/TRAINING PROGRAM

## 2024-10-18 PROCEDURE — 36012 PLACE CATHETER IN VEIN: CPT | Mod: 50

## 2024-10-18 PROCEDURE — 255N000002 HC RX 255 OP 636: Performed by: STUDENT IN AN ORGANIZED HEALTH CARE EDUCATION/TRAINING PROGRAM

## 2024-10-18 PROCEDURE — 75860 VEIN X-RAY NECK: CPT

## 2024-10-18 PROCEDURE — 75870 VEIN X-RAY SKULL: CPT

## 2024-10-18 RX ORDER — IODIXANOL 320 MG/ML
0-50 INJECTION, SOLUTION INTRAVASCULAR ONCE
Status: COMPLETED | OUTPATIENT
Start: 2024-10-18 | End: 2024-10-18

## 2024-10-18 RX ORDER — HEPARIN SODIUM 200 [USP'U]/100ML
1 INJECTION, SOLUTION INTRAVENOUS EVERY 5 MIN PRN
Status: DISCONTINUED | OUTPATIENT
Start: 2024-10-18 | End: 2024-10-22 | Stop reason: HOSPADM

## 2024-10-18 RX ADMIN — HEPARIN SODIUM 1 BAG: 200 INJECTION, SOLUTION INTRAVENOUS at 13:44

## 2024-10-18 RX ADMIN — LIDOCAINE HYDROCHLORIDE 2 ML: 10 INJECTION, SOLUTION EPIDURAL; INFILTRATION; INTRACAUDAL; PERINEURAL at 13:25

## 2024-10-18 RX ADMIN — IODIXANOL 60 ML: 320 INJECTION, SOLUTION INTRAVASCULAR at 11:52

## 2024-10-18 ASSESSMENT — ACTIVITIES OF DAILY LIVING (ADL)
ADLS_ACUITY_SCORE: 35

## 2024-10-22 ASSESSMENT — ACTIVITIES OF DAILY LIVING (ADL)
ADLS_ACUITY_SCORE: 35

## 2024-10-29 ENCOUNTER — TELEPHONE (OUTPATIENT)
Dept: NEUROLOGY | Facility: CLINIC | Age: 64
End: 2024-10-29
Payer: COMMERCIAL

## 2024-10-29 DIAGNOSIS — M24.20 EAGLE'S SYNDROME: Primary | ICD-10-CM

## 2024-10-29 DIAGNOSIS — I87.1 COMPRESSION OF VEIN: ICD-10-CM

## 2024-10-29 DIAGNOSIS — G93.2 INCREASED INTRACRANIAL PRESSURE: ICD-10-CM

## 2024-10-29 NOTE — TELEPHONE ENCOUNTER
Referral,clinic notes and imaging reports faxed to [facility]/Care Coordinator for Dr Cornelius Hernández.  Faxed a request to the film room asking that they send a CD with IR jess varner from [facility],CTV from Dr Spears to:    Elder West/Care Coordinator  Neuroscience [facility]  [address]  [place] [address]

## 2024-10-29 NOTE — TELEPHONE ENCOUNTER
----- Message from Avtar DOVER Cha sent at 10/29/2024  9:07 AM CDT -----  Regarding: External referral Dr. Cornelius Hernández  Hello everyone,  Please send this external referral to Dr. Cornelius Hernández in [place], [place].  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   Here is the name of his coordinator and their contact information below.  As always, patient will need my first and last notes, the IR venogram from Dr. Roach, all CTVs, CTAs, and ultrasounds sent with the referral, including sending the CD. I will connect you to Elder by email. Thank you.     Elder West. [email or web address]  Patient Care Coordinator  Neuroscience [place] - Neurosurgery  [facility] [place]  [address] [place] [address]  Office: [phone]  Fax: [phone]

## 2024-12-31 DIAGNOSIS — I99.8 ISCHEMIA: ICD-10-CM

## 2025-03-06 ENCOUNTER — TRANSFERRED RECORDS (OUTPATIENT)
Dept: HEALTH INFORMATION MANAGEMENT | Facility: CLINIC | Age: 65
End: 2025-03-06
Payer: COMMERCIAL

## 2025-03-11 ENCOUNTER — CARE COORDINATION (OUTPATIENT)
Dept: NEUROLOGY | Facility: CLINIC | Age: 65
End: 2025-03-11
Payer: COMMERCIAL

## 2025-03-11 NOTE — PROGRESS NOTES
Echo report and MRI Cspine reports sent to Dr Hernández.  [phone]  Sent a fax request to the film room asking that they push echo and MRI Cspine from 12/23/22 via [employer] share code [id number]

## 2025-03-31 ENCOUNTER — TRANSFERRED RECORDS (OUTPATIENT)
Dept: HEALTH INFORMATION MANAGEMENT | Facility: CLINIC | Age: 65
End: 2025-03-31

## 2025-03-31 ENCOUNTER — ANCILLARY PROCEDURE (OUTPATIENT)
Dept: CARDIOLOGY | Facility: CLINIC | Age: 65
End: 2025-03-31
Attending: PSYCHIATRY & NEUROLOGY
Payer: COMMERCIAL

## 2025-03-31 DIAGNOSIS — R07.89 OTHER CHEST PAIN: ICD-10-CM

## 2025-03-31 LAB — LVEF ECHO: NORMAL

## 2025-03-31 PROCEDURE — 93306 TTE W/DOPPLER COMPLETE: CPT | Performed by: INTERNAL MEDICINE

## 2025-04-02 ENCOUNTER — DOCUMENTATION ONLY (OUTPATIENT)
Dept: NEUROLOGY | Facility: CLINIC | Age: 65
End: 2025-04-02
Payer: COMMERCIAL

## 2025-04-07 ENCOUNTER — TELEPHONE (OUTPATIENT)
Dept: NEUROLOGY | Facility: CLINIC | Age: 65
End: 2025-04-07
Payer: COMMERCIAL

## 2025-04-07 NOTE — TELEPHONE ENCOUNTER
[facility] Call Center    Phone Message    May a detailed message be left on voicemail: yes     Reason for Call: Other: Pt returning call, requesting call back.    Please contact pt at [phone]    Action Taken: Message routed to:  [facility] Neurology    Travel Screening: Not Applicable     Date of Service:

## 2025-04-07 NOTE — TELEPHONE ENCOUNTER
Patient initially called due to receiving new that Dr. Hernández was not going to take he on as a patient. She met with Dr. Hernández on 4/3 with a request to complete another Echo & MRI to evaluate for any further abnormalities. She then heard from his nurse that he was no longer going to proceed with surgery and they were going to refer back to Dr. Spears. However, patient requested to speak to Dr. Hernández since the plan from 4/3 was to move forward with surgery and to complete further imaging just to ensure there were no further abnormalities. She then received a call back stating that they have mixed her up with a different patient. They are proceeding with surgery with Dr. Hernández, however no date set at this time.     Dr. Hernández is requesting a note from Dr. Spears stating when to hold Brilinta.

## 2025-04-07 NOTE — TELEPHONE ENCOUNTER
Health Call Center    Phone Message    May a detailed message be left on voicemail: yes     Reason for Call: Other: Patient requests a call ASAP concerning phone call from Dr. Hernández's office that patient received today.    Patient requests to speak with Dr. Spears      Action Taken: [facility]    Travel Screening: Not Applicable     Date of Service:

## 2025-08-19 ENCOUNTER — ANCILLARY PROCEDURE (OUTPATIENT)
Dept: CT IMAGING | Facility: CLINIC | Age: 65
End: 2025-08-19
Attending: PSYCHIATRY & NEUROLOGY
Payer: COMMERCIAL

## 2025-08-19 DIAGNOSIS — M24.20 EAGLE'S SYNDROME: ICD-10-CM

## 2025-08-19 DIAGNOSIS — G24.3 CERVICAL DYSTONIA: ICD-10-CM

## 2025-08-19 DIAGNOSIS — M43.6 CONTRACTURE OF STERNOCLEIDOMASTOID MUSCLE: ICD-10-CM

## 2025-08-19 DIAGNOSIS — G93.2 INCREASED INTRACRANIAL PRESSURE: ICD-10-CM

## 2025-08-19 DIAGNOSIS — I87.1 COMPRESSION OF VEIN: ICD-10-CM

## 2025-08-19 DIAGNOSIS — M54.2 NECK PAIN: ICD-10-CM

## 2025-08-19 PROCEDURE — 70498 CT ANGIOGRAPHY NECK: CPT | Mod: GC | Performed by: RADIOLOGY

## 2025-08-19 RX ORDER — IOPAMIDOL 755 MG/ML
70 INJECTION, SOLUTION INTRAVASCULAR ONCE
Status: COMPLETED | OUTPATIENT
Start: 2025-08-19 | End: 2025-08-19

## 2025-08-19 RX ADMIN — IOPAMIDOL 70 ML: 755 INJECTION, SOLUTION INTRAVASCULAR at 18:24

## 2025-08-25 ENCOUNTER — TELEPHONE (OUTPATIENT)
Dept: NEUROLOGY | Facility: CLINIC | Age: 65
End: 2025-08-25
Payer: COMMERCIAL

## 2025-09-04 ENCOUNTER — VIRTUAL VISIT (OUTPATIENT)
Dept: NEUROLOGY | Facility: CLINIC | Age: 65
End: 2025-09-04
Payer: COMMERCIAL

## 2025-09-04 DIAGNOSIS — M24.20 EAGLE'S SYNDROME: ICD-10-CM

## 2025-09-04 DIAGNOSIS — I87.8 VENOUS CONGESTION: Primary | ICD-10-CM

## 2025-09-04 DIAGNOSIS — R60.0 LOCAL EDEMA: ICD-10-CM

## 2025-09-04 DIAGNOSIS — I87.1 INNOMINATE VEIN STENOSIS, LEFT: ICD-10-CM

## 2025-09-04 DIAGNOSIS — G93.2 INCREASED INTRACRANIAL PRESSURE: ICD-10-CM

## 2025-09-04 RX ORDER — FUROSEMIDE 20 MG/1
TABLET ORAL
Qty: 60 TABLET | Refills: 3 | Status: SHIPPED | OUTPATIENT
Start: 2025-09-04

## 2025-09-04 ASSESSMENT — PAIN SCALES - GENERAL: PAINLEVEL_OUTOF10: SEVERE PAIN (10)

## (undated) RX ORDER — LIDOCAINE HYDROCHLORIDE 10 MG/ML
INJECTION, SOLUTION EPIDURAL; INFILTRATION; INTRACAUDAL; PERINEURAL
Status: DISPENSED
Start: 2024-10-18

## (undated) RX ORDER — LIDOCAINE HYDROCHLORIDE 10 MG/ML
INJECTION, SOLUTION EPIDURAL; INFILTRATION; INTRACAUDAL; PERINEURAL
Status: DISPENSED
Start: 2023-05-31

## (undated) RX ORDER — SODIUM CHLORIDE 9 MG/ML
INJECTION, SOLUTION INTRAVENOUS
Status: DISPENSED
Start: 2023-06-05

## (undated) RX ORDER — LIDOCAINE HYDROCHLORIDE 10 MG/ML
INJECTION, SOLUTION EPIDURAL; INFILTRATION; INTRACAUDAL; PERINEURAL
Status: DISPENSED
Start: 2023-06-05

## (undated) RX ORDER — HEPARIN SODIUM 200 [USP'U]/100ML
INJECTION, SOLUTION INTRAVENOUS
Status: DISPENSED
Start: 2024-10-18

## (undated) RX ORDER — LIDOCAINE HYDROCHLORIDE 20 MG/ML
INJECTION, SOLUTION INFILTRATION; PERINEURAL
Status: DISPENSED
Start: 2023-03-30